# Patient Record
Sex: MALE | Race: WHITE | Employment: FULL TIME | URBAN - METROPOLITAN AREA
[De-identification: names, ages, dates, MRNs, and addresses within clinical notes are randomized per-mention and may not be internally consistent; named-entity substitution may affect disease eponyms.]

---

## 2017-03-01 ENCOUNTER — TELEPHONE (OUTPATIENT)
Dept: INTERNAL MEDICINE CLINIC | Age: 52
End: 2017-03-01

## 2017-03-21 ENCOUNTER — HOSPITAL ENCOUNTER (OUTPATIENT)
Dept: ENDOSCOPY | Age: 52
Discharge: HOME | End: 2017-03-21
Attending: INTERNAL MEDICINE | Admitting: INTERNAL MEDICINE

## 2017-03-21 VITALS
OXYGEN SATURATION: 97 % | TEMPERATURE: 97.5 F | BODY MASS INDEX: 31.81 KG/M2 | RESPIRATION RATE: 16 BRPM | WEIGHT: 222.2 LBS | DIASTOLIC BLOOD PRESSURE: 91 MMHG | HEART RATE: 82 BPM | SYSTOLIC BLOOD PRESSURE: 135 MMHG | HEIGHT: 70 IN

## 2017-03-21 DIAGNOSIS — R52 PAIN: ICD-10-CM

## 2017-03-21 LAB
ANION GAP SERPL CALCULATED.3IONS-SCNC: 14 MMOL/L (ref 3–16)
BUN BLDV-MCNC: 10 MG/DL (ref 7–20)
CALCIUM SERPL-MCNC: 8.6 MG/DL (ref 8.3–10.6)
CHLORIDE BLD-SCNC: 102 MMOL/L (ref 99–110)
CO2: 24 MMOL/L (ref 21–32)
CREAT SERPL-MCNC: 0.9 MG/DL (ref 0.9–1.3)
GFR AFRICAN AMERICAN: >60
GFR NON-AFRICAN AMERICAN: >60
GLUCOSE BLD-MCNC: 106 MG/DL (ref 70–99)
POTASSIUM SERPL-SCNC: 3.9 MMOL/L (ref 3.5–5.1)
SODIUM BLD-SCNC: 140 MMOL/L (ref 136–145)

## 2017-03-21 RX ORDER — LIDOCAINE HYDROCHLORIDE 10 MG/ML
1 INJECTION, SOLUTION EPIDURAL; INFILTRATION; INTRACAUDAL; PERINEURAL
Status: ACTIVE | OUTPATIENT
Start: 2017-03-21 | End: 2017-03-21

## 2017-03-21 RX ORDER — ONDANSETRON 2 MG/ML
4 INJECTION INTRAMUSCULAR; INTRAVENOUS
Status: ACTIVE | OUTPATIENT
Start: 2017-03-21 | End: 2017-03-21

## 2017-03-21 RX ORDER — SODIUM CHLORIDE 9 MG/ML
INJECTION, SOLUTION INTRAVENOUS CONTINUOUS
Status: DISCONTINUED | OUTPATIENT
Start: 2017-03-21 | End: 2017-03-22 | Stop reason: HOSPADM

## 2017-03-21 RX ORDER — SODIUM CHLORIDE 0.9 % (FLUSH) 0.9 %
10 SYRINGE (ML) INJECTION PRN
Status: DISCONTINUED | OUTPATIENT
Start: 2017-03-21 | End: 2017-03-22 | Stop reason: HOSPADM

## 2017-03-21 RX ORDER — SODIUM CHLORIDE 0.9 % (FLUSH) 0.9 %
10 SYRINGE (ML) INJECTION EVERY 12 HOURS SCHEDULED
Status: DISCONTINUED | OUTPATIENT
Start: 2017-03-21 | End: 2017-03-22 | Stop reason: HOSPADM

## 2017-03-21 RX ORDER — FENTANYL CITRATE 50 UG/ML
25 INJECTION, SOLUTION INTRAMUSCULAR; INTRAVENOUS EVERY 5 MIN PRN
Status: DISCONTINUED | OUTPATIENT
Start: 2017-03-21 | End: 2017-03-22 | Stop reason: HOSPADM

## 2017-03-21 RX ORDER — MEPERIDINE HYDROCHLORIDE 25 MG/ML
12.5 INJECTION INTRAMUSCULAR; INTRAVENOUS; SUBCUTANEOUS EVERY 5 MIN PRN
Status: DISCONTINUED | OUTPATIENT
Start: 2017-03-21 | End: 2017-03-22 | Stop reason: HOSPADM

## 2017-03-21 RX ORDER — FENTANYL CITRATE 50 UG/ML
50 INJECTION, SOLUTION INTRAMUSCULAR; INTRAVENOUS EVERY 5 MIN PRN
Status: DISCONTINUED | OUTPATIENT
Start: 2017-03-21 | End: 2017-03-22 | Stop reason: HOSPADM

## 2017-03-21 RX ORDER — COVID-19 ANTIGEN TEST
KIT MISCELLANEOUS
COMMUNITY

## 2017-03-21 RX ORDER — IBUPROFEN 200 MG
200 TABLET ORAL EVERY 6 HOURS PRN
COMMUNITY

## 2017-03-21 ASSESSMENT — PAIN - FUNCTIONAL ASSESSMENT: PAIN_FUNCTIONAL_ASSESSMENT: 0-10

## 2017-04-03 ENCOUNTER — OFFICE VISIT (OUTPATIENT)
Dept: RHEUMATOLOGY | Age: 52
End: 2017-04-03

## 2017-04-03 VITALS
BODY MASS INDEX: 30.92 KG/M2 | WEIGHT: 216 LBS | SYSTOLIC BLOOD PRESSURE: 118 MMHG | HEIGHT: 70 IN | DIASTOLIC BLOOD PRESSURE: 82 MMHG | HEART RATE: 72 BPM

## 2017-04-03 DIAGNOSIS — L40.50 PSORIATIC ARTHRITIS (HCC): Primary | Chronic | ICD-10-CM

## 2017-04-03 LAB
ALBUMIN SERPL-MCNC: 4.2 G/DL (ref 3.4–5)
ALP BLD-CCNC: 84 U/L (ref 40–129)
ALT SERPL-CCNC: 38 U/L (ref 10–40)
AST SERPL-CCNC: 28 U/L (ref 15–37)
BASOPHILS ABSOLUTE: 0 K/UL (ref 0–0.2)
BASOPHILS RELATIVE PERCENT: 0.2 %
BILIRUB SERPL-MCNC: 0.5 MG/DL (ref 0–1)
BILIRUBIN DIRECT: <0.2 MG/DL (ref 0–0.3)
BILIRUBIN, INDIRECT: NORMAL MG/DL (ref 0–1)
CREAT SERPL-MCNC: 0.9 MG/DL (ref 0.9–1.3)
EOSINOPHILS ABSOLUTE: 0.2 K/UL (ref 0–0.6)
EOSINOPHILS RELATIVE PERCENT: 2 %
GFR AFRICAN AMERICAN: >60
GFR NON-AFRICAN AMERICAN: >60
HCT VFR BLD CALC: 47.5 % (ref 40.5–52.5)
HEMOGLOBIN: 16 G/DL (ref 13.5–17.5)
LYMPHOCYTES ABSOLUTE: 1.8 K/UL (ref 1–5.1)
LYMPHOCYTES RELATIVE PERCENT: 23.4 %
MCH RBC QN AUTO: 30.7 PG (ref 26–34)
MCHC RBC AUTO-ENTMCNC: 33.6 G/DL (ref 31–36)
MCV RBC AUTO: 91.4 FL (ref 80–100)
MONOCYTES ABSOLUTE: 0.3 K/UL (ref 0–1.3)
MONOCYTES RELATIVE PERCENT: 4.5 %
NEUTROPHILS ABSOLUTE: 5.3 K/UL (ref 1.7–7.7)
NEUTROPHILS RELATIVE PERCENT: 69.9 %
PDW BLD-RTO: 14.2 % (ref 12.4–15.4)
PLATELET # BLD: 189 K/UL (ref 135–450)
PMV BLD AUTO: 8.3 FL (ref 5–10.5)
RBC # BLD: 5.2 M/UL (ref 4.2–5.9)
TOTAL PROTEIN: 7.3 G/DL (ref 6.4–8.2)
WBC # BLD: 7.6 K/UL (ref 4–11)

## 2017-04-03 PROCEDURE — 99213 OFFICE O/P EST LOW 20 MIN: CPT | Performed by: INTERNAL MEDICINE

## 2017-04-03 RX ORDER — SUCRALFATE ORAL 1 G/10ML
1 SUSPENSION ORAL 4 TIMES DAILY
COMMUNITY

## 2017-04-03 RX ORDER — FLUTICASONE PROPIONATE 220 UG/1
1 AEROSOL, METERED RESPIRATORY (INHALATION) 2 TIMES DAILY
COMMUNITY

## 2017-04-03 RX ORDER — RIFAXIMIN 550 MG/1
TABLET ORAL
COMMUNITY
Start: 2017-03-29

## 2017-04-03 RX ORDER — PREDNISONE 10 MG/1
10 TABLET ORAL DAILY
COMMUNITY
End: 2017-07-24

## 2017-04-03 RX ORDER — HYDROCODONE BITARTRATE AND ACETAMINOPHEN 10; 325 MG/1; MG/1
1 TABLET ORAL EVERY 6 HOURS PRN
COMMUNITY
End: 2017-10-24 | Stop reason: HOSPADM

## 2017-04-06 LAB
QUANTIFERON (R) TB GOLD (INCUBATED): NEGATIVE
QUANTIFERON MITOGEN: >10 IU/ML
QUANTIFERON NIL: 0.03 IU/ML
QUANTIFERON TB AG MINUS NIL: 0.01 IU/ML (ref 0–0.34)

## 2017-06-21 ENCOUNTER — TELEPHONE (OUTPATIENT)
Dept: INTERNAL MEDICINE CLINIC | Age: 52
End: 2017-06-21

## 2017-07-24 ENCOUNTER — OFFICE VISIT (OUTPATIENT)
Dept: RHEUMATOLOGY | Age: 52
End: 2017-07-24

## 2017-07-24 VITALS
BODY MASS INDEX: 29.35 KG/M2 | WEIGHT: 205 LBS | SYSTOLIC BLOOD PRESSURE: 120 MMHG | HEIGHT: 70 IN | HEART RATE: 64 BPM | DIASTOLIC BLOOD PRESSURE: 82 MMHG

## 2017-07-24 DIAGNOSIS — L40.50 PSORIATIC ARTHRITIS (HCC): Primary | Chronic | ICD-10-CM

## 2017-07-24 DIAGNOSIS — L40.9 PSORIASIS: Chronic | ICD-10-CM

## 2017-07-24 DIAGNOSIS — Z79.899 ENCOUNTER FOR LONG-TERM (CURRENT) USE OF MEDICATIONS: ICD-10-CM

## 2017-07-24 LAB
ALBUMIN SERPL-MCNC: 4.4 G/DL (ref 3.4–5)
ALP BLD-CCNC: 77 U/L (ref 40–129)
ALT SERPL-CCNC: 13 U/L (ref 10–40)
AST SERPL-CCNC: 14 U/L (ref 15–37)
BASOPHILS ABSOLUTE: 0 K/UL (ref 0–0.2)
BASOPHILS RELATIVE PERCENT: 0.5 %
BILIRUB SERPL-MCNC: 0.3 MG/DL (ref 0–1)
BILIRUBIN DIRECT: <0.2 MG/DL (ref 0–0.3)
BILIRUBIN, INDIRECT: ABNORMAL MG/DL (ref 0–1)
CREAT SERPL-MCNC: 0.9 MG/DL (ref 0.9–1.3)
EOSINOPHILS ABSOLUTE: 0.4 K/UL (ref 0–0.6)
EOSINOPHILS RELATIVE PERCENT: 5 %
GFR AFRICAN AMERICAN: >60
GFR NON-AFRICAN AMERICAN: >60
HCT VFR BLD CALC: 46.8 % (ref 40.5–52.5)
HEMOGLOBIN: 15.8 G/DL (ref 13.5–17.5)
LYMPHOCYTES ABSOLUTE: 1.5 K/UL (ref 1–5.1)
LYMPHOCYTES RELATIVE PERCENT: 19 %
MCH RBC QN AUTO: 31.4 PG (ref 26–34)
MCHC RBC AUTO-ENTMCNC: 33.7 G/DL (ref 31–36)
MCV RBC AUTO: 93.2 FL (ref 80–100)
MONOCYTES ABSOLUTE: 0.3 K/UL (ref 0–1.3)
MONOCYTES RELATIVE PERCENT: 4.1 %
NEUTROPHILS ABSOLUTE: 5.7 K/UL (ref 1.7–7.7)
NEUTROPHILS RELATIVE PERCENT: 71.4 %
PDW BLD-RTO: 13.3 % (ref 12.4–15.4)
PLATELET # BLD: 182 K/UL (ref 135–450)
PMV BLD AUTO: 7.7 FL (ref 5–10.5)
RBC # BLD: 5.02 M/UL (ref 4.2–5.9)
TOTAL PROTEIN: 7 G/DL (ref 6.4–8.2)
WBC # BLD: 8 K/UL (ref 4–11)

## 2017-07-24 PROCEDURE — 99213 OFFICE O/P EST LOW 20 MIN: CPT | Performed by: INTERNAL MEDICINE

## 2017-07-24 RX ORDER — TEMAZEPAM 15 MG/1
15 CAPSULE ORAL
COMMUNITY
End: 2018-08-02

## 2017-07-24 RX ORDER — ONDANSETRON 4 MG/1
4 TABLET, FILM COATED ORAL
COMMUNITY
Start: 2017-06-30 | End: 2018-08-02

## 2017-08-17 RX ORDER — PREDNISONE 1 MG/1
5 TABLET ORAL 2 TIMES DAILY
Qty: 60 TABLET | Refills: 0 | Status: SHIPPED | OUTPATIENT
Start: 2017-08-17 | End: 2017-11-28 | Stop reason: SDUPTHER

## 2017-08-18 RX ORDER — PREDNISONE 1 MG/1
5 TABLET ORAL 2 TIMES DAILY
Qty: 60 TABLET | Refills: 0 | Status: SHIPPED | OUTPATIENT
Start: 2017-08-18 | End: 2018-06-13

## 2017-08-21 ENCOUNTER — TELEPHONE (OUTPATIENT)
Dept: INTERNAL MEDICINE CLINIC | Age: 52
End: 2017-08-21

## 2017-10-03 ENCOUNTER — TELEPHONE (OUTPATIENT)
Dept: INTERNAL MEDICINE CLINIC | Age: 52
End: 2017-10-03

## 2017-10-24 ENCOUNTER — HOSPITAL ENCOUNTER (OUTPATIENT)
Dept: ENDOSCOPY | Age: 52
Discharge: HOME | End: 2017-10-24
Attending: INTERNAL MEDICINE | Admitting: INTERNAL MEDICINE

## 2017-10-24 VITALS
DIASTOLIC BLOOD PRESSURE: 84 MMHG | SYSTOLIC BLOOD PRESSURE: 134 MMHG | TEMPERATURE: 98.3 F | BODY MASS INDEX: 29.82 KG/M2 | HEIGHT: 69 IN | RESPIRATION RATE: 16 BRPM | OXYGEN SATURATION: 98 % | HEART RATE: 67 BPM | WEIGHT: 201.3 LBS

## 2017-10-24 DIAGNOSIS — R11.2 NAUSEA VOMITING AND DIARRHEA: ICD-10-CM

## 2017-10-24 DIAGNOSIS — R19.7 NAUSEA VOMITING AND DIARRHEA: ICD-10-CM

## 2017-10-24 DIAGNOSIS — R52 PAIN: ICD-10-CM

## 2017-10-24 LAB
ANION GAP SERPL CALCULATED.3IONS-SCNC: 11 MMOL/L (ref 3–16)
BUN BLDV-MCNC: 11 MG/DL (ref 7–20)
CALCIUM SERPL-MCNC: 9 MG/DL (ref 8.3–10.6)
CHLORIDE BLD-SCNC: 105 MMOL/L (ref 99–110)
CO2: 24 MMOL/L (ref 21–32)
CREAT SERPL-MCNC: 0.9 MG/DL (ref 0.9–1.3)
GFR AFRICAN AMERICAN: >60
GFR NON-AFRICAN AMERICAN: >60
GLUCOSE BLD-MCNC: 124 MG/DL (ref 70–99)
POTASSIUM SERPL-SCNC: 3.9 MMOL/L (ref 3.5–5.1)
SODIUM BLD-SCNC: 140 MMOL/L (ref 136–145)

## 2017-10-24 RX ORDER — FENTANYL CITRATE 50 UG/ML
25 INJECTION, SOLUTION INTRAMUSCULAR; INTRAVENOUS EVERY 5 MIN PRN
Status: DISCONTINUED | OUTPATIENT
Start: 2017-10-24 | End: 2017-10-25 | Stop reason: HOSPADM

## 2017-10-24 RX ORDER — SODIUM CHLORIDE 0.9 % (FLUSH) 0.9 %
10 SYRINGE (ML) INJECTION PRN
Status: DISCONTINUED | OUTPATIENT
Start: 2017-10-24 | End: 2017-10-25 | Stop reason: HOSPADM

## 2017-10-24 RX ORDER — HYDROCODONE BITARTRATE AND ACETAMINOPHEN 5; 325 MG/1; MG/1
1 TABLET ORAL EVERY 6 HOURS PRN
Qty: 20 TABLET | Refills: 0 | Status: SHIPPED | OUTPATIENT
Start: 2017-10-24 | End: 2017-10-31

## 2017-10-24 RX ORDER — MEPERIDINE HYDROCHLORIDE 25 MG/ML
12.5 INJECTION INTRAMUSCULAR; INTRAVENOUS; SUBCUTANEOUS EVERY 5 MIN PRN
Status: DISCONTINUED | OUTPATIENT
Start: 2017-10-24 | End: 2017-10-25 | Stop reason: HOSPADM

## 2017-10-24 RX ORDER — LABETALOL HYDROCHLORIDE 5 MG/ML
5 INJECTION, SOLUTION INTRAVENOUS EVERY 10 MIN PRN
Status: DISCONTINUED | OUTPATIENT
Start: 2017-10-24 | End: 2017-10-25 | Stop reason: HOSPADM

## 2017-10-24 RX ORDER — ONDANSETRON 4 MG/1
4 TABLET, FILM COATED ORAL EVERY 8 HOURS PRN
Qty: 25 TABLET | Refills: 1 | Status: SHIPPED | OUTPATIENT
Start: 2017-10-24

## 2017-10-24 RX ORDER — SODIUM CHLORIDE 9 MG/ML
INJECTION, SOLUTION INTRAVENOUS CONTINUOUS
Status: DISCONTINUED | OUTPATIENT
Start: 2017-10-24 | End: 2017-10-25 | Stop reason: HOSPADM

## 2017-10-24 RX ORDER — ONDANSETRON 2 MG/ML
4 INJECTION INTRAMUSCULAR; INTRAVENOUS
Status: ACTIVE | OUTPATIENT
Start: 2017-10-24 | End: 2017-10-24

## 2017-10-24 RX ORDER — FENTANYL CITRATE 50 UG/ML
50 INJECTION, SOLUTION INTRAMUSCULAR; INTRAVENOUS EVERY 5 MIN PRN
Status: DISCONTINUED | OUTPATIENT
Start: 2017-10-24 | End: 2017-10-25 | Stop reason: HOSPADM

## 2017-10-24 RX ORDER — DIPHENHYDRAMINE HYDROCHLORIDE 50 MG/ML
12.5 INJECTION INTRAMUSCULAR; INTRAVENOUS
Status: ACTIVE | OUTPATIENT
Start: 2017-10-24 | End: 2017-10-24

## 2017-10-24 RX ORDER — SODIUM CHLORIDE 0.9 % (FLUSH) 0.9 %
10 SYRINGE (ML) INJECTION EVERY 12 HOURS SCHEDULED
Status: DISCONTINUED | OUTPATIENT
Start: 2017-10-24 | End: 2017-10-25 | Stop reason: HOSPADM

## 2017-10-24 RX ADMIN — FENTANYL CITRATE 50 MCG: 50 INJECTION, SOLUTION INTRAMUSCULAR; INTRAVENOUS at 08:46

## 2017-10-24 RX ADMIN — SODIUM CHLORIDE: 9 INJECTION, SOLUTION INTRAVENOUS at 08:30

## 2017-10-24 RX ADMIN — SODIUM CHLORIDE: 9 INJECTION, SOLUTION INTRAVENOUS at 07:23

## 2017-10-24 ASSESSMENT — PAIN SCALES - GENERAL
PAINLEVEL_OUTOF10: 2
PAINLEVEL_OUTOF10: 7
PAINLEVEL_OUTOF10: 6

## 2017-10-24 ASSESSMENT — PAIN DESCRIPTION - PAIN TYPE
TYPE: SURGICAL PAIN
TYPE: SURGICAL PAIN

## 2017-10-24 ASSESSMENT — PAIN - FUNCTIONAL ASSESSMENT: PAIN_FUNCTIONAL_ASSESSMENT: 0-10

## 2017-10-24 ASSESSMENT — PAIN DESCRIPTION - ORIENTATION: ORIENTATION: UPPER

## 2017-10-24 ASSESSMENT — PAIN DESCRIPTION - LOCATION
LOCATION: ABDOMEN
LOCATION: ABDOMEN

## 2017-10-24 NOTE — ANESTHESIA PRE-OP
[address] [facility] Anesthesiology  Pre-Anesthesia Evaluation/Consultation       Name:  Virginia Tesfaye                                         Age:  47 y.o.   MRN:    [id number]           Procedure (Scheduled): ERCP ENDOSCOPIC RETROGRADE CHOLANGIOPANCREATOGRAPHY   Surgeon:     Dr. Chad Reed MD     Allergies   Allergen Reactions    Sulfites     Vicodin [Hydrocodone-Acetaminophen] Shortness Of Breath    Celecoxib     Crestor [Rosuvastatin]      myalgias    Cymbalta [Duloxetine Hcl]     Dilaudid [Hydromorphone Hcl]      hyper    Ibuprofen     Lidocaine     Methotrexate Derivatives      Nausea and diarrhea    Methylphenidate      Abdominal pain+nausea    Morphine     Morphine And Related Other (See Comments)     Jittery, hyperactivity, unable to sleep    Nortriptyline     Other      All elissa food coloring mold  msg addatives    Percocet [Oxycodone-Acetaminophen]     Procaine     Questran [Cholestyramine] Other (See Comments)     Abdominal pain + nausea    Sulfa Antibiotics      swelling    Valium [Diazepam]     Vioxx [Rofecoxib]     Welchol [Colesevelam Hcl]     Xylocaine [Lidocaine Hcl]     Cortisone Nausea And Vomiting     Patient Active Problem List   Diagnosis    Seronegative arthritis    Psoriatic arthritis ([facility] [address]    Psoriasis     Past Medical History:   Diagnosis Date    Anesthesia     wakes up early    Arthritis     psoriatic    Asthma     GERD (gastroesophageal reflux disease)     Headache(784.0)     Hyperlipidemia     Hypertension     Irritable bowel syndrome     Seizures ([facility] [address]     27 years ago  accident no seizure in years     Past Surgical History:   Procedure Laterality Date    CHOLECYSTECTOMY      DENTAL SURGERY      ERCP  03/21/2017    with sphincterotomy, stent placement, biopsy , balloon sweep    HERNIA REPAIR      SHOULDER SURGERY      x2    UPPER GASTROINTESTINAL ENDOSCOPY  03/21/2017     Social History   Substance Use Topics    Smoking status: Never Smoker    Smokeless tobacco: Never Used    Alcohol use No       Prior to Admission medications    Medication Sig Start Date End Date Taking? Authorizing Provider   predniSONE (DELTASONE) 5 MG tablet Take 1 tablet by mouth 2 times daily 8/18/17  Yes Cory Wiggins MD   adalimumab (HUMIRA) 40 MG/0.8ML injection Inject 40 mg into the skin once a week 8/17/17  Yes Cory Wiggins MD   sertraline (ZOLOFT) 50 MG tablet TAKE 1 TABLET BY MOUTH AT SUPPERTIME 6/7/17  Yes Historical Provider, MD   sucralfate (CARAFATE) 1 GM/10ML suspension Take 1 g by mouth 4 times daily   Yes Historical Provider, MD   fluticasone (FLOVENT HFA) 220 MCG/ACT inhaler Inhale 1 puff into the lungs 2 times daily   Yes Historical Provider, MD   Naproxen Sodium (ALEVE) 220 MG CAPS Take by mouth   Yes Historical Provider, MD   ibuprofen (ADVIL;MOTRIN) 200 MG tablet Take 200 mg by mouth every 6 hours as needed for Pain   Yes Historical Provider, MD   tamsulosin (FLOMAX) 0.4 MG capsule Take 0.4 mg by mouth daily   Yes Historical Provider, MD   albuterol sulfate  (90 BASE) MCG/ACT inhaler Inhale 2 puffs into the lungs every 6 hours as needed for Wheezing   Yes Historical Provider, MD   Esomeprazole Magnesium (NEXIUM PO) Take by mouth   Yes Historical Provider, MD   ranitidine (ZANTAC) 300 MG tablet Take 300 mg by mouth 2 times daily   Yes Historical Provider, MD   atorvastatin (LIPITOR) 20 MG tablet TAKE 1 TABLET BY MOUTH DAILY. 10/12/16  Yes Historical Provider, MD   spironolactone (ALDACTONE) 25 MG tablet TAKE 1 TABLET BY MOUTH EVERY DAY 10/12/16  Yes Historical Provider, MD   temazepam (RESTORIL) 15 MG capsule Take 15 mg by mouth nightly as needed for Sleep   Yes Historical Provider, MD   cyanocobalamin 1000 MCG/ML injection INJECT 1ML INTRAMUSCULARLY EVERY 30 DAYS 6/21/15  Yes Colby Hilario MD   diphenhydrAMINE (BENADRYL) 25 MG tablet Take 25 mg by mouth every 6 hours as needed for Itching.    Yes Historical Provider, MD   PROPRANOLOL BY MOUTH AT SUPPERTIME      sucralfate (CARAFATE) 1 GM/10ML suspension Take 1 g by mouth 4 times daily      fluticasone (FLOVENT HFA) 220 MCG/ACT inhaler Inhale 1 puff into the lungs 2 times daily      Naproxen Sodium (ALEVE) 220 MG CAPS Take by mouth      ibuprofen (ADVIL;MOTRIN) 200 MG tablet Take 200 mg by mouth every 6 hours as needed for Pain      tamsulosin (FLOMAX) 0.4 MG capsule Take 0.4 mg by mouth daily      albuterol sulfate  (90 BASE) MCG/ACT inhaler Inhale 2 puffs into the lungs every 6 hours as needed for Wheezing      Esomeprazole Magnesium (NEXIUM PO) Take by mouth      ranitidine (ZANTAC) 300 MG tablet Take 300 mg by mouth 2 times daily      atorvastatin (LIPITOR) 20 MG tablet TAKE 1 TABLET BY MOUTH DAILY. 0    spironolactone (ALDACTONE) 25 MG tablet TAKE 1 TABLET BY MOUTH EVERY DAY  0    temazepam (RESTORIL) 15 MG capsule Take 15 mg by mouth nightly as needed for Sleep      cyanocobalamin 1000 MCG/ML injection INJECT 1ML INTRAMUSCULARLY EVERY 30 DAYS 1 mL 5    diphenhydrAMINE (BENADRYL) 25 MG tablet Take 25 mg by mouth every 6 hours as needed for Itching.  PROPRANOLOL HCL PO   Take 40 mg by mouth daily       clonazePAM (KLONOPIN) 1 MG tablet Take 1 mg by mouth 2 times daily as needed (Uses as a muscle relaxer).  dicyclomine (BENTYL) 10 MG capsule Take 10 mg by mouth 4 times daily (before meals and nightly).  promethazine (PHENERGAN) 25 MG tablet Take 25 mg by mouth every 6 hours as needed.  cyclobenzaprine (FLEXERIL) 10 MG tablet Take 10 mg by mouth 3 times daily as needed.  predniSONE (DELTASONE) 5 MG tablet Take 1 tablet by mouth 2 times daily 60 tablet 0    temazepam (RESTORIL) 15 MG capsule Take 15 mg by mouth      ondansetron (ZOFRAN) 4 MG tablet Take 4 mg by mouth      XIFAXAN 550 MG tablet       HYDROcodone-acetaminophen (NORCO)  MG per tablet Take 1 tablet by mouth every 6 hours as needed for Pain .       ondansetron (ZOFRAN) 4 07/24/2017    HGB 15.8 07/24/2017    HCT 46.8 07/24/2017    MCV 93.2 07/24/2017    RDW 13.3 07/24/2017     07/24/2017       CMP    Lab Results   Component Value Date     03/21/2017    K 3.9 03/21/2017     03/21/2017    CO2 24 03/21/2017    BUN 10 03/21/2017    CREATININE 0.9 07/24/2017    GFRAA >60 07/24/2017    AGRATIO 1.5 12/19/2016    LABGLOM >60 07/24/2017    LABGLOM 91 12/19/2016    GLUCOSE 106 03/21/2017    PROT 7.0 07/24/2017    CALCIUM 8.6 03/21/2017    BILITOT 0.3 07/24/2017    ALKPHOS 77 07/24/2017    AST 14 07/24/2017    ALT 13 07/24/2017       BMP    Lab Results   Component Value Date     03/21/2017    K 3.9 03/21/2017     03/21/2017    CO2 24 03/21/2017    BUN 10 03/21/2017    CREATININE 0.9 07/24/2017    CALCIUM 8.6 03/21/2017    GFRAA >60 07/24/2017    LABGLOM >60 07/24/2017    LABGLOM 91 12/19/2016    GLUCOSE 106 03/21/2017       Coags   No results found for: PROTIME, INR, APTT    HCG (If Applicable) No results found for: PREGTESTUR, PREGSERUM, HCG, HCGQUANT     ABGs  No results found for: PHART, PO2ART, SFJ8XKK, ASZ2COX, BEART, C8XHWOKK     Type & Screen (If Applicable)  No results found for: LABABO, LABRH      POCGlucose  No results for input(s): GLUCOSE in the last 72 hours. NPO Status  > 8 hours   Date of last liquid consumption: 10/23/17   Time of last liquid consumption: 2000   Date of last solid food consumption: 10/23/17      Time of last solid consumption: 1800    BMI  Body mass index is 29.73 kg/m². Estimated body mass index is 29.73 kg/m² as calculated from the following:    Height as of this encounter: 5' 9\" (1.753 m). Weight as of this encounter: 201 lb 4.8 oz (91.3 kg).       Additional Testing (Echo, Stress, ECG, PFTs, etc)        Anesthesia Evaluation  Patient summary reviewed and Nursing notes reviewed no history of anesthetic complications:   Airway: Mallampati: II  TM distance: >3 FB   Neck ROM: full  Mouth opening: > = 3 FB Dental: normal exam for Pain      tamsulosin (FLOMAX) 0.4 MG capsule Take 0.4 mg by mouth daily      albuterol sulfate  (90 BASE) MCG/ACT inhaler Inhale 2 puffs into the lungs every 6 hours as needed for Wheezing      Esomeprazole Magnesium (NEXIUM PO) Take by mouth      ranitidine (ZANTAC) 300 MG tablet Take 300 mg by mouth 2 times daily      atorvastatin (LIPITOR) 20 MG tablet TAKE 1 TABLET BY MOUTH DAILY. 0    spironolactone (ALDACTONE) 25 MG tablet TAKE 1 TABLET BY MOUTH EVERY DAY  0    temazepam (RESTORIL) 15 MG capsule Take 15 mg by mouth nightly as needed for Sleep      cyanocobalamin 1000 MCG/ML injection INJECT 1ML INTRAMUSCULARLY EVERY 30 DAYS 1 mL 5    diphenhydrAMINE (BENADRYL) 25 MG tablet Take 25 mg by mouth every 6 hours as needed for Itching.  PROPRANOLOL HCL PO   Take 40 mg by mouth daily       clonazePAM (KLONOPIN) 1 MG tablet Take 1 mg by mouth 2 times daily as needed (Uses as a muscle relaxer).  dicyclomine (BENTYL) 10 MG capsule Take 10 mg by mouth 4 times daily (before meals and nightly).  promethazine (PHENERGAN) 25 MG tablet Take 25 mg by mouth every 6 hours as needed.  cyclobenzaprine (FLEXERIL) 10 MG tablet Take 10 mg by mouth 3 times daily as needed.  predniSONE (DELTASONE) 5 MG tablet Take 1 tablet by mouth 2 times daily 60 tablet 0    temazepam (RESTORIL) 15 MG capsule Take 15 mg by mouth      ondansetron (ZOFRAN) 4 MG tablet Take 4 mg by mouth      XIFAXAN 550 MG tablet       HYDROcodone-acetaminophen (NORCO)  MG per tablet Take 1 tablet by mouth every 6 hours as needed for Pain .  ondansetron (ZOFRAN) 4 MG tablet Take 1 tablet by mouth every 8 hours as needed for Nausea or Vomiting 25 tablet 1    B-D TB SYRINGE 1CC/25GX5/8\" 25G X 5/8\" 1 ML MISC USE AS DIRECTED 20 each 3    ferrous sulfate 325 (65 FE) MG tablet Take 325 mg by mouth daily (with breakfast).       diphenoxylate-atropine (LOMOTIL) 2.5-0.025 MG per tablet Take 1 tablet by mouth 4 times daily as needed.  hydrocortisone (ANUSOL-HC) 25 MG suppository Place 25 mg rectally 2 times daily. Current Facility-Administered Medications   Medication Dose Route Frequency Provider Last Rate Last Dose    sodium chloride flush 0.9 % injection 10 mL  10 mL Intravenous 2 times per day Megan Camacho MD        sodium chloride flush 0.9 % injection 10 mL  10 mL Intravenous PRN Megan Camacho MD        0.9 % sodium chloride infusion   Intravenous Continuous Megan Camacho MD        triamcinolone acetonide [facility]) injection 20 mg  20 mg Intra-articular Once Charly Caleb, CNP        lidocaine 1 % injection 1 mL  1 mL Intra-articular Once Charly Caleb, CNP           Allergies:     Allergies   Allergen Reactions    Sulfites     Vicodin [Hydrocodone-Acetaminophen] Shortness Of Breath    Celecoxib     Crestor [Rosuvastatin]      myalgias    Cymbalta [Duloxetine Hcl]     Dilaudid [Hydromorphone Hcl]      hyper    Ibuprofen     Lidocaine     Methotrexate Derivatives      Nausea and diarrhea    Methylphenidate      Abdominal pain+nausea    Morphine     Morphine And Related Other (See Comments)     Jittery, hyperactivity, unable to sleep    Nortriptyline     Other      All elissa food coloring mold  msg addatives    Percocet [Oxycodone-Acetaminophen]     Procaine     Questran [Cholestyramine] Other (See Comments)     Abdominal pain + nausea    Sulfa Antibiotics      swelling    Valium [Diazepam]     Vioxx [Rofecoxib]     Welchol [Colesevelam Hcl]     Xylocaine [Lidocaine Hcl]     Cortisone Nausea And Vomiting       Problem List:    Patient Active Problem List   Diagnosis Code    Seronegative arthritis M13.80    Psoriatic arthritis ([facility] [address] L40.50    Psoriasis L40.9       Past Medical History:        Diagnosis Date    Anesthesia     wakes up early    Arthritis     psoriatic    Asthma     GERD (gastroesophageal reflux disease)    

## 2017-10-24 NOTE — H&P
[address] [place] [address]   Pre-operative History and Physical    Patient: Noah Núñez  : 1965  Acct#:     History Obtained From:  patient, electronic medical record    HISTORY OF PRESENT ILLNESS:    The patient is a 47 y.o. male with significant past medical history of eosinophilic esophagitis, psoriatic arthritis and recurrent, episodic RUQ pain and underwent empiric biliary sphincterotomy for presumed ampullary stenosis in 3/2017 who presents with recurrent RUQ pain to r/o pancreatic sphincter of Oddi dysfunction.     Past Medical History:        Diagnosis Date    Anesthesia     wakes up early    Arthritis     psoriatic    Asthma     GERD (gastroesophageal reflux disease)     Headache(784.0)     Hyperlipidemia     Hypertension     Irritable bowel syndrome     Seizures ([address]     27 years ago  accident no seizure in years      Past Surgical History:        Procedure Laterality Date    CHOLECYSTECTOMY      DENTAL SURGERY      ERCP  2017    with sphincterotomy, stent placement, biopsy , balloon sweep    HERNIA REPAIR      SHOULDER SURGERY      x2    UPPER GASTROINTESTINAL ENDOSCOPY  2017      Medications Prior to Admission:   Current Outpatient Prescriptions on File Prior to Encounter   Medication Sig Dispense Refill    predniSONE (DELTASONE) 5 MG tablet Take 1 tablet by mouth 2 times daily 60 tablet 0    adalimumab (HUMIRA) 40 MG/0.8ML injection Inject 40 mg into the skin once a week 4 each 2    sertraline (ZOLOFT) 50 MG tablet TAKE 1 TABLET BY MOUTH AT SUPPERTIME      sucralfate (CARAFATE) 1 GM/10ML suspension Take 1 g by mouth 4 times daily      fluticasone (FLOVENT HFA) 220 MCG/ACT inhaler Inhale 1 puff into the lungs 2 times daily      Naproxen Sodium (ALEVE) 220 MG CAPS Take by mouth      ibuprofen (ADVIL;MOTRIN) 200 MG tablet Take 200 mg by mouth every 6 hours as needed for Pain      tamsulosin (FLOMAX) 0.4 MG capsule Take 0.4 mg by mouth daily      albuterol sulfate  (90 BASE) MCG/ACT inhaler Inhale 2 puffs into the lungs every 6 hours as needed for Wheezing      Esomeprazole Magnesium (NEXIUM PO) Take by mouth      ranitidine (ZANTAC) 300 MG tablet Take 300 mg by mouth 2 times daily      atorvastatin (LIPITOR) 20 MG tablet TAKE 1 TABLET BY MOUTH DAILY. 0    spironolactone (ALDACTONE) 25 MG tablet TAKE 1 TABLET BY MOUTH EVERY DAY  0    temazepam (RESTORIL) 15 MG capsule Take 15 mg by mouth nightly as needed for Sleep      cyanocobalamin 1000 MCG/ML injection INJECT 1ML INTRAMUSCULARLY EVERY 30 DAYS 1 mL 5    diphenhydrAMINE (BENADRYL) 25 MG tablet Take 25 mg by mouth every 6 hours as needed for Itching.  PROPRANOLOL HCL PO   Take 40 mg by mouth daily       clonazePAM (KLONOPIN) 1 MG tablet Take 1 mg by mouth 2 times daily as needed (Uses as a muscle relaxer).  dicyclomine (BENTYL) 10 MG capsule Take 10 mg by mouth 4 times daily (before meals and nightly).  promethazine (PHENERGAN) 25 MG tablet Take 25 mg by mouth every 6 hours as needed.  cyclobenzaprine (FLEXERIL) 10 MG tablet Take 10 mg by mouth 3 times daily as needed.  predniSONE (DELTASONE) 5 MG tablet Take 1 tablet by mouth 2 times daily 60 tablet 0    temazepam (RESTORIL) 15 MG capsule Take 15 mg by mouth      ondansetron (ZOFRAN) 4 MG tablet Take 4 mg by mouth      XIFAXAN 550 MG tablet       HYDROcodone-acetaminophen (NORCO)  MG per tablet Take 1 tablet by mouth every 6 hours as needed for Pain .  ondansetron (ZOFRAN) 4 MG tablet Take 1 tablet by mouth every 8 hours as needed for Nausea or Vomiting 25 tablet 1    B-D TB SYRINGE 1CC/25GX5/8\" 25G X 5/8\" 1 ML MISC USE AS DIRECTED 20 each 3    ferrous sulfate 325 (65 FE) MG tablet Take 325 mg by mouth daily (with breakfast).  diphenoxylate-atropine (LOMOTIL) 2.5-0.025 MG per tablet Take 1 tablet by mouth 4 times daily as needed.         hydrocortisone (ANUSOL-HC) 25 MG suppository Place 25 mg rectally 2 times daily. Current Facility-Administered Medications on File Prior to Encounter   Medication Dose Route Frequency Provider Last Rate Last Dose    triamcinolone acetonide (KENALOG-40) injection 20 mg  20 mg Intra-articular Once Reny Purdue, CNP        lidocaine 1 % injection 1 mL  1 mL Intra-articular Once Reny Purdue, CNP            Allergies:  Sulfites; Vicodin [hydrocodone-acetaminophen]; Celecoxib; Crestor [rosuvastatin]; Cymbalta [duloxetine hcl]; Dilaudid [hydromorphone hcl]; Ibuprofen; Lidocaine; Methotrexate derivatives; Methylphenidate; Morphine; Morphine and related; Nortriptyline; Other; Percocet [oxycodone-acetaminophen]; Procaine; Questran [cholestyramine]; Sulfa antibiotics; Valium [diazepam]; Vioxx [rofecoxib]; Welchol [colesevelam hcl]; Xylocaine [lidocaine hcl]; and Cortisone    History of allergic reaction to anesthesia:  No    Social History:   Social History     Social History    Marital status:      Spouse name: N/A    Number of children: N/A    Years of education: N/A     Occupational History    Not on file.      Social History Main Topics    Smoking status: Never Smoker    Smokeless tobacco: Never Used    Alcohol use No    Drug use: No    Sexual activity: Not on file     Other Topics Concern    Not on file     Social History Narrative    No narrative on file      Family History:       Problem Relation Age of Onset    Cancer Mother     High Blood Pressure Mother     Heart Disease Mother     Diabetes Father     Cancer Father     Stroke Paternal Uncle         PHYSICAL EXAM:      /80   Pulse 76   Temp 98.5 °F (36.9 °C) (Temporal)   Resp 14   Ht 5' 9\" (1.753 m)   Wt 201 lb 4.8 oz (91.3 kg)   SpO2 95%   BMI 29.73 kg/m²  I        Heart:  Normal apical impulse, regular rate and rhythm, normal S1 and S2, no S3 or S4, and no murmur noted    Lungs:  No increased work of breathing, good air exchange, clear to auscultation bilaterally, no

## 2017-10-24 NOTE — OP NOTE
[address] [place] [address]  Endoscopy Note    Patient: Nicolasa Baker   : 1965  Acct#: [de-identified]    Procedure: Endoscopic retrograde cholangiopancreatography with sphincter of Oddi manometry, ampullary biopsy, pancreatic sphincterotomy and pancreatic stent placement    Date: 10/24/2017    Surgeon:  Andres Brooks MD    Referring Physician:   Kris Huggins    Preoperative Diagnosis:   RUQ abdominal pain    Postoperative Diagnosis:   Pancreatic sphincter of Oddi manometry    Anesthesia:  General per Anesthesia. EBL: <50 mL    Indications: This is a 47y.o. year old male who presents today with recurrent, episodic RUQ pain c/w pancreatic-type pain. R/o sphincter of Oddi dysfunction. Procedure: Informed consent was obtained from the patient after explanation of indications, benefits, possible risks and complications of the procedure. The patient was then taken to the endoscopy suite. A time-out was performed. The patient and staff were in agreement as to the correct patient and procedure. The above anesthesia was administered by the anesthesia department. The patient was placed in the left lateral prone position. Vital signs and heart rhythm were monitored prior to and throughout the entire procedure. The Olympus [id number] Video therapeutic duodenoscope was placed in the patient's mouth and blindly advanced into the esophagus. The scope was then advanced through the esophagus, stomach and into the second portion of the duodenum where the major papilla was visualized. The major papilla was c/w previous biliary sphincterotomy. The residual ampullary mound was large and cold biopsies were obtained. The biliary and pancreatic duct orifices were cannulated in turn using a triple-lumen water perfusion aspirating manometry catheter. Findings were as follows:    Sphincter of Oddi Manometry:  The station pull-through method was used to obtain pressure measurements in the pancreatic sphincter. The mean basal sphincter pressure in the pancreatic orifice was 80 mmHg (normal less than 40 mmHg). Cholangiogram:  The biliary orifice was then selectively cannulated and the biliary tree was opacified using a tapered tip, pull - type sphincterotome. The common bile duct, common hepatic duct and intrahepatic ducts were normal.   A cystic duct stump with terminal clips c/w previous cholectectomy was noted. Pancreatogram:  The main pancreatic duct was then selectively cannulated and opacified to the tail. The main pancreatic duct was noted to have a normal course and caliber in the head, body and tail, with normal side branches. The biliary sphinctertomy was extended 4 mm in the 12 o'clock direction using the tapered sphincterotome and blended cautery current over a guidewire. A 6 mm pancreatic sphincterotomy was performed in the 2 o'clock direction using the taper-tipped sphincterotome and blended cautery current over a guidewire. A 3 Fr., 6 cm pancreatic stent with inner flaps removed was then placed for post-ERCP pancreatitis prophylaxis. The cannulas were then withdrawn. The duodenum and stomach were decompressed and the scope was withdrawn from the patient. The patient tolerated the procedure well and was taken to the post anesthesia care unit in good condition. Radiological Interpretation:  All fluoroscopic images and 5 still x-ray films were carefullly examined and interpreted by the endoscopist on the spot during the procedure in the absence of radiologist.  These interpretations guided the course of the procedure and the interventions mentioned above and below.         Impression:  1) Pancreatic sphincter of Oddi dysfunction     2) Normal cholangiogram s/p cholecystectomy     3) Normal pancreatogram     4) Biliary sphincterotomy extended as above     5) Pancreatic sphincterotomy performed     6) Pancreatic stent placement     7) Prominent residual ampullary mound - biopsied      Recommendations: 1) Clear liquid diet today and advance to low fat diet in AM as tolerated. 2) Abdomen xray in 1 month to document stent passage. 3) Check pathology report -- patient instructed to call office in 5 days for results. 4) Follow up in office in 3 months.         Urbano Conley MD  [address]  10/24/2017

## 2017-10-24 NOTE — ANESTHESIA PRE-OP
[address] [facility] Anesthesiology  Pre-Anesthesia Evaluation/Consultation       Name:  Jaylan Wayne                                         Age:  47 y.o.   MRN:    [id number]           Procedure (Scheduled): ERCP ENDOSCOPIC RETROGRADE CHOLANGIOPANCREATOGRAPHY   Surgeon:     Dr. Teofilo Sandoval MD     Allergies   Allergen Reactions    Sulfites     Vicodin [Hydrocodone-Acetaminophen] Shortness Of Breath    Celecoxib     Crestor [Rosuvastatin]      myalgias    Cymbalta [Duloxetine Hcl]     Dilaudid [Hydromorphone Hcl]      hyper    Ibuprofen     Lidocaine     Methotrexate Derivatives      Nausea and diarrhea    Methylphenidate      Abdominal pain+nausea    Morphine     Morphine And Related Other (See Comments)     Jittery, hyperactivity, unable to sleep    Nortriptyline     Other      All elissa food coloring mold  msg addatives    Percocet [Oxycodone-Acetaminophen]     Procaine     Questran [Cholestyramine]     Sulfa Antibiotics      swelling    Valium [Diazepam]     Vioxx [Rofecoxib]     Welchol [Colesevelam Hcl]     Xylocaine [Lidocaine Hcl]     Cortisone Nausea And Vomiting     Patient Active Problem List   Diagnosis    Seronegative arthritis    Psoriatic arthritis ([facility] [address]    Psoriasis     Past Medical History:   Diagnosis Date    Anesthesia     wakes up early    Arthritis     psoriatic    Asthma     GERD (gastroesophageal reflux disease)     Headache(784.0)     Hyperlipidemia     Hypertension     Irritable bowel syndrome     Seizures ([address]     27 years ago  accident no seizure in years     Past Surgical History:   Procedure Laterality Date    CHOLECYSTECTOMY      DENTAL SURGERY      ERCP  03/21/2017    with sphincterotomy, stent placement, biopsy , balloon sweep    HERNIA REPAIR      SHOULDER SURGERY      x2    UPPER GASTROINTESTINAL ENDOSCOPY  03/21/2017     Social History   Substance Use Topics    Smoking status: Never Smoker    Smokeless tobacco: Never Used   [employer] Alcohol use No       Prior to Admission medications    Medication Sig Start Date End Date Taking? Authorizing Provider   predniSONE (DELTASONE) 5 MG tablet Take 1 tablet by mouth 2 times daily 8/18/17   Saleem Ndiaye MD   predniSONE (DELTASONE) 5 MG tablet Take 1 tablet by mouth 2 times daily 8/17/17   Saleem Ndiaye MD   adalimumab (HUMIRA) 40 MG/0.8ML injection Inject 40 mg into the skin once a week 8/17/17   Saleem Ndiaye MD   temazepam (RESTORIL) 15 MG capsule Take 15 mg by mouth    Historical Provider, MD   sertraline (ZOLOFT) 50 MG tablet TAKE 1 TABLET BY MOUTH AT SUPPERTIME 6/7/17   Historical Provider, MD   ondansetron (ZOFRAN) 4 MG tablet Take 4 mg by mouth 6/30/17   Historical Provider, MD Al Gambyamile 550 MG tablet  3/29/17   Historical Provider, MD   sucralfate (CARAFATE) 1 GM/10ML suspension Take 1 g by mouth 4 times daily    Historical Provider, MD   fluticasone (FLOVENT HFA) 220 MCG/ACT inhaler Inhale 1 puff into the lungs 2 times daily    Historical Provider, MD   HYDROcodone-acetaminophen (NORCO)  MG per tablet Take 1 tablet by mouth every 6 hours as needed for Pain . Historical Provider, MD   Naproxen Sodium (ALEVE) 220 MG CAPS Take by mouth    Historical Provider, MD   ibuprofen (ADVIL;MOTRIN) 200 MG tablet Take 200 mg by mouth every 6 hours as needed for Pain    Historical Provider, MD   tamsulosin (FLOMAX) 0.4 MG capsule Take 0.4 mg by mouth daily    Historical Provider, MD   albuterol sulfate  (90 BASE) MCG/ACT inhaler Inhale 2 puffs into the lungs every 6 hours as needed for Wheezing    Historical Provider, MD   Esomeprazole Magnesium (NEXIUM PO) Take by mouth    Historical Provider, MD   omeprazole (PRILOSEC) 40 MG delayed release capsule Take 40 mg by mouth 2 times daily    Historical Provider, MD   ranitidine (ZANTAC) 300 MG tablet Take 300 mg by mouth 2 times daily    Historical Provider, MD   atorvastatin (LIPITOR) 20 MG tablet TAKE 1 TABLET BY MOUTH DAILY. 10/12/16   Karissa Fontenot MD   spironolactone (ALDACTONE) 25 MG tablet TAKE 1 TABLET BY MOUTH EVERY DAY 10/12/16   Karissa Fontenot MD   temazepam (RESTORIL) 15 MG capsule Take 15 mg by mouth nightly as needed for Sleep    Karissa Fontenot MD   ondansetron (ZOFRAN) 4 MG tablet Take 1 tablet by mouth every 8 hours as needed for Nausea or Vomiting 11/11/16   Liss Mccormick, CNP   B-D TB SYRINGE 1CC/25GX5/8\" 25G X 5/8\" 1 ML MISC USE AS DIRECTED 4/26/16   Cammie Devine MD   cyanocobalamin 1000 MCG/ML injection INJECT 1ML INTRAMUSCULARLY EVERY 30 DAYS 6/21/15   Cammie Devine MD   diphenhydrAMINE (BENADRYL) 25 MG tablet Take 25 mg by mouth every 6 hours as needed for Itching. Historical Provider, MD   PROPRANOLOL HCL PO   Take 40 mg by mouth daily     Historical Provider, MD   clonazePAM (KLONOPIN) 1 MG tablet Take 1 mg by mouth 2 times daily as needed (Uses as a muscle relaxer). Historical Provider, MD   ferrous sulfate 325 (65 FE) MG tablet Take 325 mg by mouth daily (with breakfast). Historical Provider, MD   dicyclomine (BENTYL) 10 MG capsule Take 10 mg by mouth 4 times daily (before meals and nightly). Historical Provider, MD   promethazine (PHENERGAN) 25 MG tablet Take 25 mg by mouth every 6 hours as needed. Historical Provider, MD   cyclobenzaprine (FLEXERIL) 10 MG tablet Take 10 mg by mouth 3 times daily as needed. Historical Provider, MD   diphenoxylate-atropine (LOMOTIL) 2.5-0.025 MG per tablet Take 1 tablet by mouth 4 times daily as needed. Historical Provider, MD   hydrocortisone (ANUSOL-HC) 25 MG suppository Place 25 mg rectally 2 times daily.       Historical Provider, MD       Current Outpatient Prescriptions   Medication Sig Dispense Refill    predniSONE (DELTASONE) 5 MG tablet Take 1 tablet by mouth 2 times daily 60 tablet 0    predniSONE (DELTASONE) 5 MG tablet Take 1 tablet by mouth 2 times daily 60 tablet 0    adalimumab (HUMIRA) 40 MG/0.8ML injection Inject 40 mg into the skin once a week 4 each 2    temazepam (RESTORIL) 15 MG capsule Take 15 mg by mouth      sertraline (ZOLOFT) 50 MG tablet TAKE 1 TABLET BY MOUTH AT SUPPERTIME      ondansetron (ZOFRAN) 4 MG tablet Take 4 mg by mouth      XIFAXAN 550 MG tablet       sucralfate (CARAFATE) 1 GM/10ML suspension Take 1 g by mouth 4 times daily      fluticasone (FLOVENT HFA) 220 MCG/ACT inhaler Inhale 1 puff into the lungs 2 times daily      HYDROcodone-acetaminophen (NORCO)  MG per tablet Take 1 tablet by mouth every 6 hours as needed for Pain .  Naproxen Sodium (ALEVE) 220 MG CAPS Take by mouth      ibuprofen (ADVIL;MOTRIN) 200 MG tablet Take 200 mg by mouth every 6 hours as needed for Pain      tamsulosin (FLOMAX) 0.4 MG capsule Take 0.4 mg by mouth daily      albuterol sulfate  (90 BASE) MCG/ACT inhaler Inhale 2 puffs into the lungs every 6 hours as needed for Wheezing      Esomeprazole Magnesium (NEXIUM PO) Take by mouth      omeprazole (PRILOSEC) 40 MG delayed release capsule Take 40 mg by mouth 2 times daily      ranitidine (ZANTAC) 300 MG tablet Take 300 mg by mouth 2 times daily      atorvastatin (LIPITOR) 20 MG tablet TAKE 1 TABLET BY MOUTH DAILY. 0    spironolactone (ALDACTONE) 25 MG tablet TAKE 1 TABLET BY MOUTH EVERY DAY  0    temazepam (RESTORIL) 15 MG capsule Take 15 mg by mouth nightly as needed for Sleep      ondansetron (ZOFRAN) 4 MG tablet Take 1 tablet by mouth every 8 hours as needed for Nausea or Vomiting 25 tablet 1    B-D TB SYRINGE 1CC/25GX5/8\" 25G X 5/8\" 1 ML MISC USE AS DIRECTED 20 each 3    cyanocobalamin 1000 MCG/ML injection INJECT 1ML INTRAMUSCULARLY EVERY 30 DAYS 1 mL 5    diphenhydrAMINE (BENADRYL) 25 MG tablet Take 25 mg by mouth every 6 hours as needed for Itching.  PROPRANOLOL HCL PO   Take 40 mg by mouth daily       clonazePAM (KLONOPIN) 1 MG tablet Take 1 mg by mouth 2 times daily as needed (Uses as a muscle relaxer).       ferrous sulfate 325 (65 FE) MG tablet Take 325 mg by mouth daily (with breakfast).  dicyclomine (BENTYL) 10 MG capsule Take 10 mg by mouth 4 times daily (before meals and nightly).  promethazine (PHENERGAN) 25 MG tablet Take 25 mg by mouth every 6 hours as needed.  cyclobenzaprine (FLEXERIL) 10 MG tablet Take 10 mg by mouth 3 times daily as needed.  diphenoxylate-atropine (LOMOTIL) 2.5-0.025 MG per tablet Take 1 tablet by mouth 4 times daily as needed.  hydrocortisone (ANUSOL-HC) 25 MG suppository Place 25 mg rectally 2 times daily. Current Facility-Administered Medications   Medication Dose Route Frequency Provider Last Rate Last Dose    triamcinolone acetonide (KENALOG-40) injection 20 mg  20 mg Intra-articular Once Art ANALISA Marino        lidocaine 1 % injection 1 mL  1 mL Intra-articular Once Art ANALISA Marino           Vital Signs  (Current) There were no vitals filed for this visit.   (for past 48 hrs)  No Data Recorded  (last three values)   BP Readings from Last 3 Encounters:   07/24/17 120/82   04/03/17 118/82   03/21/17 (!) 135/91       CBC  Lab Results   Component Value Date    WBC 8.0 07/24/2017    RBC 5.02 07/24/2017    HGB 15.8 07/24/2017    HCT 46.8 07/24/2017    MCV 93.2 07/24/2017    RDW 13.3 07/24/2017     07/24/2017       CMP    Lab Results   Component Value Date     03/21/2017    K 3.9 03/21/2017     03/21/2017    CO2 24 03/21/2017    BUN 10 03/21/2017    CREATININE 0.9 07/24/2017    GFRAA >60 07/24/2017    AGRATIO 1.5 12/19/2016    LABGLOM >60 07/24/2017    LABGLOM 91 12/19/2016    GLUCOSE 106 03/21/2017    PROT 7.0 07/24/2017    CALCIUM 8.6 03/21/2017    BILITOT 0.3 07/24/2017    ALKPHOS 77 07/24/2017    AST 14 07/24/2017    ALT 13 07/24/2017       BMP    Lab Results   Component Value Date     03/21/2017    K 3.9 03/21/2017     03/21/2017    CO2 24 03/21/2017    BUN 10 03/21/2017    CREATININE 0.9 07/24/2017 CALCIUM 8.6 03/21/2017    GFRAA >60 07/24/2017    LABGLOM >60 07/24/2017    LABGLOM 91 12/19/2016    GLUCOSE 106 03/21/2017       Coags   No results found for: PROTIME, INR, APTT    HCG (If Applicable) No results found for: PREGTESTUR, PREGSERUM, HCG, HCGQUANT     ABGs  No results found for: PHART, PO2ART, RYC5BGI, IQX3CEX, BEART, L6EROFHJ     Type & Screen (If Applicable)  No results found for: LABABO, LABRH      POCGlucose  No results for input(s): GLUCOSE in the last 72 hours. NPO Status  > 8 hours                       BMI  There is no height or weight on file to calculate BMI. Estimated body mass index is 27.22 kg/m² as calculated from the following:    Height as of 10/5/17: 5' 9.5\" (1.765 m). Weight as of 10/5/17: 187 lb (84.8 kg). Additional Testing (Echo, Stress, ECG, PFTs, etc)        Anesthesia Evaluation  Patient summary reviewed and Nursing notes reviewed no history of anesthetic complications:   Airway: Mallampati: II  TM distance: >3 FB   Neck ROM: full  Mouth opening: > = 3 FB Dental: normal exam         Pulmonary:normal exam    (+) asthma:                            Cardiovascular:  Exercise tolerance: good (>4 METS),   (+) hypertension: moderate, hyperlipidemia      ECG reviewed  Rhythm: regular  Rate: normal           Beta Blocker:  Dose within 24 Hrs         Neuro/Psych:   (+) headaches:,             GI/Hepatic/Renal:   (+) GERD: no interval change, PUD,           Endo/Other:    (+) : arthritis: no interval change. Abdominal:           Vascular:                                      Anesthesia Plan      general     ASA 3     (Plan for GETA with standard ASA monitoring. Additional monitoring as dictated by intra-operative course. Patient appropriately NPO for the procedure. Risk/Benefits reviewed with patient and all anesthetic questions answered prior to procedure.  )  Induction: intravenous.     MIPS: Postoperative opioids intended and Prophylactic antiemetics administered. Anesthetic plan and risks discussed with patient. Plan discussed with CRNA. DOS STAFF ADDENDUM:    Pt seen and examined, chart reviewed (including anesthesia, drug and allergy history). No interval changes to history and physical examination. Anesthetic plan, risks, benefits, alternatives, and personnel involved discussed with patient. Patient verbalized an understanding and agrees to proceed.       Debbie Starr DO  October 24, 2017  6:32 AM

## 2017-10-24 NOTE — PROGRESS NOTES
Adm to pacu from endo per cart awakening. Respirations symmetrical. Abdomen soft. C/O pain upper abdomen & sore throat. Ice chips offered.

## 2017-10-25 NOTE — ANESTHESIA POST-OP
Anesthesia Post-op Note    Patient: Lewis Yang  MRN: [id number]  YOB: 1965  Date of evaluation: 10/25/2017  Time:  1:35 PM     Procedure(s) Performed:     Last Vitals: /84   Pulse 67   Temp 98.3 °F (36.8 °C) (Temporal)   Resp 16   Ht 5' 9\" (1.753 m)   Wt 201 lb 4.8 oz (91.3 kg)   SpO2 98%   BMI 29.73 kg/m²     Heather Phase I: Heather Score: 10    Heather Phase II: Heather Score: 10    Anesthesia Post Evaluation    Final anesthesia type: general  Patient location during evaluation: PACU  Patient participation: complete - patient participated  Level of consciousness: awake and alert  Pain score: 0  Airway patency: patent  Nausea & Vomiting: no nausea and no vomiting  Complications: no  Cardiovascular status: blood pressure returned to baseline  Respiratory status: acceptable  Hydration status: euvolemic        ora DO Evy  1:35 PM

## 2017-11-21 ENCOUNTER — OFFICE VISIT (OUTPATIENT)
Dept: RHEUMATOLOGY | Age: 52
End: 2017-11-21

## 2017-11-21 VITALS
WEIGHT: 208 LBS | DIASTOLIC BLOOD PRESSURE: 84 MMHG | HEART RATE: 76 BPM | BODY MASS INDEX: 30.81 KG/M2 | SYSTOLIC BLOOD PRESSURE: 132 MMHG | HEIGHT: 69 IN

## 2017-11-21 DIAGNOSIS — Z79.899 ENCOUNTER FOR LONG-TERM (CURRENT) USE OF MEDICATIONS: ICD-10-CM

## 2017-11-21 DIAGNOSIS — L40.50 PSORIATIC ARTHRITIS (HCC): Primary | Chronic | ICD-10-CM

## 2017-11-21 LAB
ALBUMIN SERPL-MCNC: 4.3 G/DL (ref 3.4–5)
ALP BLD-CCNC: 57 U/L (ref 40–129)
ALT SERPL-CCNC: 24 U/L (ref 10–40)
AST SERPL-CCNC: 22 U/L (ref 15–37)
BASOPHILS ABSOLUTE: 0 K/UL (ref 0–0.2)
BASOPHILS RELATIVE PERCENT: 0.2 %
BILIRUB SERPL-MCNC: 0.4 MG/DL (ref 0–1)
BILIRUBIN DIRECT: <0.2 MG/DL (ref 0–0.3)
BILIRUBIN, INDIRECT: NORMAL MG/DL (ref 0–1)
CREAT SERPL-MCNC: 0.9 MG/DL (ref 0.9–1.3)
EOSINOPHILS ABSOLUTE: 0.4 K/UL (ref 0–0.6)
EOSINOPHILS RELATIVE PERCENT: 3.8 %
GFR AFRICAN AMERICAN: >60
GFR NON-AFRICAN AMERICAN: >60
HCT VFR BLD CALC: 44.9 % (ref 40.5–52.5)
HEMOGLOBIN: 15.5 G/DL (ref 13.5–17.5)
LYMPHOCYTES ABSOLUTE: 3.5 K/UL (ref 1–5.1)
LYMPHOCYTES RELATIVE PERCENT: 32.4 %
MCH RBC QN AUTO: 30.7 PG (ref 26–34)
MCHC RBC AUTO-ENTMCNC: 34.6 G/DL (ref 31–36)
MCV RBC AUTO: 88.7 FL (ref 80–100)
MONOCYTES ABSOLUTE: 0.6 K/UL (ref 0–1.3)
MONOCYTES RELATIVE PERCENT: 5.5 %
NEUTROPHILS ABSOLUTE: 6.2 K/UL (ref 1.7–7.7)
NEUTROPHILS RELATIVE PERCENT: 58.1 %
PDW BLD-RTO: 13.1 % (ref 12.4–15.4)
PLATELET # BLD: 176 K/UL (ref 135–450)
PMV BLD AUTO: 7.2 FL (ref 5–10.5)
RBC # BLD: 5.06 M/UL (ref 4.2–5.9)
TOTAL PROTEIN: 7 G/DL (ref 6.4–8.2)
WBC # BLD: 10.7 K/UL (ref 4–11)

## 2017-11-21 PROCEDURE — 99213 OFFICE O/P EST LOW 20 MIN: CPT | Performed by: INTERNAL MEDICINE

## 2017-11-21 NOTE — PROGRESS NOTES
exam trace swelling scattered PIPs no definite swelling wrists or knees. Skin psoriatic lesions for some right hand. Patient reports psoriatic lesions in the inner groin  Assessment:      Psoriatic arthritis improved. Biologic therapy      Plan:      Blood work will be obtained today to monitor for adverse drug reactions. Laboratory studies from last visit were reviewed. I'll see the patient back in 4 months time.

## 2017-12-05 ENCOUNTER — HOSPITAL ENCOUNTER (OUTPATIENT)
Dept: ENDOSCOPY | Age: 52
Discharge: HOME | End: 2017-12-05
Attending: INTERNAL MEDICINE | Admitting: INTERNAL MEDICINE

## 2017-12-05 VITALS
OXYGEN SATURATION: 99 % | HEIGHT: 69 IN | RESPIRATION RATE: 14 BRPM | WEIGHT: 207.25 LBS | SYSTOLIC BLOOD PRESSURE: 123 MMHG | TEMPERATURE: 97.2 F | DIASTOLIC BLOOD PRESSURE: 81 MMHG | HEART RATE: 71 BPM | BODY MASS INDEX: 30.7 KG/M2

## 2017-12-05 RX ORDER — ONDANSETRON 2 MG/ML
INJECTION INTRAMUSCULAR; INTRAVENOUS
Status: COMPLETED
Start: 2017-12-05 | End: 2017-12-05

## 2017-12-05 RX ORDER — ONDANSETRON 2 MG/ML
4 INJECTION INTRAMUSCULAR; INTRAVENOUS ONCE
Status: COMPLETED | OUTPATIENT
Start: 2017-12-05 | End: 2017-12-05

## 2017-12-05 RX ORDER — SODIUM CHLORIDE 9 MG/ML
INJECTION, SOLUTION INTRAVENOUS CONTINUOUS
Status: DISCONTINUED | OUTPATIENT
Start: 2017-12-05 | End: 2017-12-06 | Stop reason: HOSPADM

## 2017-12-05 RX ORDER — SODIUM CHLORIDE 0.9 % (FLUSH) 0.9 %
10 SYRINGE (ML) INJECTION EVERY 12 HOURS SCHEDULED
Status: DISCONTINUED | OUTPATIENT
Start: 2017-12-05 | End: 2017-12-06 | Stop reason: HOSPADM

## 2017-12-05 RX ORDER — SODIUM CHLORIDE 0.9 % (FLUSH) 0.9 %
10 SYRINGE (ML) INJECTION PRN
Status: DISCONTINUED | OUTPATIENT
Start: 2017-12-05 | End: 2017-12-06 | Stop reason: HOSPADM

## 2017-12-05 RX ADMIN — ONDANSETRON 4 MG: 2 INJECTION INTRAMUSCULAR; INTRAVENOUS at 10:23

## 2017-12-05 RX ADMIN — SODIUM CHLORIDE: 9 INJECTION, SOLUTION INTRAVENOUS at 09:20

## 2017-12-05 ASSESSMENT — PAIN DESCRIPTION - DESCRIPTORS: DESCRIPTORS: CRAMPING;ACHING;DULL

## 2017-12-05 ASSESSMENT — PAIN - FUNCTIONAL ASSESSMENT: PAIN_FUNCTIONAL_ASSESSMENT: 0-10

## 2017-12-05 NOTE — PROGRESS NOTES
Patient received to PACU in stable condition. VSS. Denies pain. Abdomin soft non tender. Will continue to monitor.

## 2017-12-05 NOTE — ANESTHESIA PRE-OP
[address] [facility] Anesthesiology  Pre-Anesthesia Evaluation/Consultation       Name:  Goran Rivera                                         Age:  47 y.o.   MRN:    [id number]           Procedure (Scheduled): EGD ESOPHAGOGASTRODUODENOSCOPY DILATATION   Surgeon:     Dr. Sheldon Goldsmith MD     Allergies   Allergen Reactions    Sulfites     Vicodin [Hydrocodone-Acetaminophen] Shortness Of Breath    Celecoxib     Crestor [Rosuvastatin]      myalgias    Cymbalta [Duloxetine Hcl]     Dilaudid [Hydromorphone Hcl]      hyper    Ibuprofen     Lidocaine     Methotrexate Derivatives      Nausea and diarrhea    Methylphenidate      Abdominal pain+nausea    Morphine     Morphine And Related Other (See Comments)     Jittery, hyperactivity, unable to sleep    Nortriptyline     Other      All elissa food coloring mold  msg addatives    Percocet [Oxycodone-Acetaminophen]     Procaine     Questran [Cholestyramine] Other (See Comments)     Abdominal pain + nausea    Sulfa Antibiotics      swelling    Valium [Diazepam]     Vioxx [Rofecoxib]     Welchol [Colesevelam Hcl]     Xylocaine [Lidocaine Hcl]     Cortisone Nausea And Vomiting     Patient Active Problem List   Diagnosis    Seronegative arthritis    Psoriatic arthritis ([facility] [address]    Psoriasis     Past Medical History:   Diagnosis Date    Anesthesia     wakes up early    Arthritis     psoriatic    Asthma     GERD (gastroesophageal reflux disease)     Headache(784.0)     Hyperlipidemia     Hypertension     Irritable bowel syndrome     Seizures ([facility] [address]     27 years ago  accident no seizure in years     Past Surgical History:   Procedure Laterality Date    CHOLECYSTECTOMY      DENTAL SURGERY      ERCP  03/21/2017    with sphincterotomy, stent placement, biopsy , balloon sweep    ERCP  10/24/2017    ALLAN,SPHINCTEROTOMY, PLACEMENT OF PANCREATIC STENT, BIOPSY OF AMPULLA    HERNIA REPAIR      SHOULDER SURGERY      x2    UPPER GASTROINTESTINAL 10/12/16   Karissa Fontenot MD   spironolactone (ALDACTONE) 25 MG tablet TAKE 1 TABLET BY MOUTH EVERY DAY 10/12/16   Karissa Fontenot MD   temazepam (RESTORIL) 15 MG capsule Take 15 mg by mouth nightly as needed for Sleep    Historical ProviderMD CAMPO-TOMAS TB SYRINGE 1CC/25GX5/8\" 25G X 5/8\" 1 ML MISC USE AS DIRECTED 4/26/16   Jaxson Sheldon MD   cyanocobalamin 1000 MCG/ML injection INJECT 1ML INTRAMUSCULARLY EVERY 30 DAYS 6/21/15   Jaxson Sheldon MD   diphenhydrAMINE (BENADRYL) 25 MG tablet Take 25 mg by mouth every 6 hours as needed for Itching. Historical Provider, MD   PROPRANOLOL HCL PO   Take 40 mg by mouth daily     Historical Provider, MD   clonazePAM (KLONOPIN) 1 MG tablet Take 1 mg by mouth 2 times daily as needed (Uses as a muscle relaxer). Karissa Provider, MD   ferrous sulfate 325 (65 FE) MG tablet Take 325 mg by mouth daily (with breakfast). Historical Provider, MD   dicyclomine (BENTYL) 10 MG capsule Take 10 mg by mouth 4 times daily (before meals and nightly). Historical Provider, MD   promethazine (PHENERGAN) 25 MG tablet Take 25 mg by mouth every 6 hours as needed. Historical Provider, MD   cyclobenzaprine (FLEXERIL) 10 MG tablet Take 10 mg by mouth 3 times daily as needed. Historical Provider, MD   diphenoxylate-atropine (LOMOTIL) 2.5-0.025 MG per tablet Take 1 tablet by mouth 4 times daily as needed. Historical Provider, MD   hydrocortisone (ANUSOL-HC) 25 MG suppository Place 25 mg rectally 2 times daily.       Historical Provider, MD       Current Outpatient Prescriptions   Medication Sig Dispense Refill    ondansetron (ZOFRAN) 4 MG tablet Take 1 tablet by mouth every 8 hours as needed for Nausea or Vomiting 25 tablet 1    predniSONE (DELTASONE) 5 MG tablet Take 1 tablet by mouth 2 times daily (Patient taking differently: Take 5 mg by mouth 2 times daily Pt states not currently taking) 60 tablet 0    adalimumab (HUMIRA) 40 MG/0.8ML injection Inject 40 mg into the skin once a week 4 each 2    temazepam (RESTORIL) 15 MG capsule Take 15 mg by mouth      sertraline (ZOLOFT) 50 MG tablet TAKE 1 TABLET BY MOUTH AT SUPPERTIME      ondansetron (ZOFRAN) 4 MG tablet Take 4 mg by mouth      XIFAXAN 550 MG tablet       sucralfate (CARAFATE) 1 GM/10ML suspension Take 1 g by mouth 4 times daily      fluticasone (FLOVENT HFA) 220 MCG/ACT inhaler Inhale 1 puff into the lungs 2 times daily      Naproxen Sodium (ALEVE) 220 MG CAPS Take by mouth      ibuprofen (ADVIL;MOTRIN) 200 MG tablet Take 200 mg by mouth every 6 hours as needed for Pain      tamsulosin (FLOMAX) 0.4 MG capsule Take 0.4 mg by mouth daily      albuterol sulfate  (90 BASE) MCG/ACT inhaler Inhale 2 puffs into the lungs every 6 hours as needed for Wheezing      Esomeprazole Magnesium (NEXIUM PO) Take by mouth      ranitidine (ZANTAC) 300 MG tablet Take 300 mg by mouth 2 times daily      atorvastatin (LIPITOR) 20 MG tablet TAKE 1 TABLET BY MOUTH DAILY. 0    spironolactone (ALDACTONE) 25 MG tablet TAKE 1 TABLET BY MOUTH EVERY DAY  0    temazepam (RESTORIL) 15 MG capsule Take 15 mg by mouth nightly as needed for Sleep      B-D TB SYRINGE 1CC/25GX5/8\" 25G X 5/8\" 1 ML MISC USE AS DIRECTED 20 each 3    cyanocobalamin 1000 MCG/ML injection INJECT 1ML INTRAMUSCULARLY EVERY 30 DAYS 1 mL 5    diphenhydrAMINE (BENADRYL) 25 MG tablet Take 25 mg by mouth every 6 hours as needed for Itching.  PROPRANOLOL HCL PO   Take 40 mg by mouth daily       clonazePAM (KLONOPIN) 1 MG tablet Take 1 mg by mouth 2 times daily as needed (Uses as a muscle relaxer).  ferrous sulfate 325 (65 FE) MG tablet Take 325 mg by mouth daily (with breakfast).  dicyclomine (BENTYL) 10 MG capsule Take 10 mg by mouth 4 times daily (before meals and nightly).  promethazine (PHENERGAN) 25 MG tablet Take 25 mg by mouth every 6 hours as needed.         cyclobenzaprine (FLEXERIL) 10 MG tablet Take 10 mg by mouth 3

## 2017-12-05 NOTE — H&P
fluticasone (FLOVENT HFA) 220 MCG/ACT inhaler Inhale 1 puff into the lungs 2 times daily      Naproxen Sodium (ALEVE) 220 MG CAPS Take by mouth      ibuprofen (ADVIL;MOTRIN) 200 MG tablet Take 200 mg by mouth every 6 hours as needed for Pain      tamsulosin (FLOMAX) 0.4 MG capsule Take 0.4 mg by mouth daily      albuterol sulfate  (90 BASE) MCG/ACT inhaler Inhale 2 puffs into the lungs every 6 hours as needed for Wheezing      Esomeprazole Magnesium (NEXIUM PO) Take by mouth      ranitidine (ZANTAC) 300 MG tablet Take 300 mg by mouth 2 times daily      atorvastatin (LIPITOR) 20 MG tablet TAKE 1 TABLET BY MOUTH DAILY. 0    spironolactone (ALDACTONE) 25 MG tablet TAKE 1 TABLET BY MOUTH EVERY DAY  0    temazepam (RESTORIL) 15 MG capsule Take 15 mg by mouth nightly as needed for Sleep      B-D TB SYRINGE 1CC/25GX5/8\" 25G X 5/8\" 1 ML MISC USE AS DIRECTED 20 each 3    cyanocobalamin 1000 MCG/ML injection INJECT 1ML INTRAMUSCULARLY EVERY 30 DAYS 1 mL 5    diphenhydrAMINE (BENADRYL) 25 MG tablet Take 25 mg by mouth every 6 hours as needed for Itching.  PROPRANOLOL HCL PO   Take 40 mg by mouth daily       clonazePAM (KLONOPIN) 1 MG tablet Take 1 mg by mouth 2 times daily as needed (Uses as a muscle relaxer).  ferrous sulfate 325 (65 FE) MG tablet Take 325 mg by mouth daily (with breakfast).  dicyclomine (BENTYL) 10 MG capsule Take 10 mg by mouth 4 times daily (before meals and nightly).  promethazine (PHENERGAN) 25 MG tablet Take 25 mg by mouth every 6 hours as needed.  cyclobenzaprine (FLEXERIL) 10 MG tablet Take 10 mg by mouth 3 times daily as needed.  diphenoxylate-atropine (LOMOTIL) 2.5-0.025 MG per tablet Take 1 tablet by mouth 4 times daily as needed.  hydrocortisone (ANUSOL-HC) 25 MG suppository Place 25 mg rectally 2 times daily.          Current Facility-Administered Medications on File Prior to Encounter   Medication Dose Route Frequency Provider Last male here for EGD with anesthesia  2. Procedure options, risks and benefits reviewed with patient. Patient expresses understanding.      Francisca Chaudhary MD  [address]  12/5/2017

## 2017-12-05 NOTE — OP NOTE
[address] [place] [address]  Endoscopy Note    Patient: Qiana Thurston  : 1965  Acct#: [de-identified]    Procedure: Esophagogastroduodenoscopy with biopsy, esophageal balloon dilation    Date:  2017     Surgeon:  Roberto Lopez MD    Referring Physician:   Juliano Omer    Preoperative Diagnosis:   Dysphagia    Postoperative Diagnosis:   1) Distal esophageal stenosis      2) Gastric antral ulcers      3) Duodenal erosions    Anesthesia:  TIVA/MAC per anesthesia     EBL: <50 mL    Indications: This is a 47y.o. year old male who presents today with New-onset dyspepsia and dysphagia. He has a history of eosinophilic esophagitis. Description of Procedure:  Informed consent was obtained from the patient after explanation of indications, benefits and possible risks and complications of the procedure. The patient was then taken to the endoscopy suite, placed in the left lateral decubitus position and the above IV sedation was administrered. The Olympus videoendoscope was placed in the patient's mouth and under direct visualization passed into the esophagus. Visualization of the esophagus demonstrated normal mucosa. There was mild stenosis at the GE junction that was balloon dilated to 20 mm. The scope was then advanced into the stomach. Visualization of the gastric body and antrum demonstrated multiple small ulcers (2 - 3 mm) that were cold biopsied. A retroflexed exam of the gastric cardia and fundus demonstrated normal mucosa. The pylorus was patent and the scope was advanced into the duodenum. Visualization of the duodenal bulb demonstrated a single ulceration ~3 mm in size. The second portion of the duodenum demonstrated patchy mucosal erythema. The scope was then withdrawn back into the stomach, it was decompressed, and the scope was completely withdrawn. The patient tolerated the procedure well and was taken to the post anesthesia care unit in good condition.     Impression: 1) Distal esophageal stenosis - balloon dilated. 2) Gastric antral ulcers - cold biopsied     3) Duodenal bulb ulceration       Recommendations: 1) Stop NSAID's as much as possible. 2) Consider increase Nexium to 40 mg BID. 3) Check pathology. 4) Follow up in office in 3 months.     Lisbeth Yo, [address]

## 2017-12-05 NOTE — PROGRESS NOTES
Patient received to PACU in stable condition. VSS. Patient meets discharge criteria for phase 1. Seen by anesthesia. OK to transfer to phase 2.

## 2018-02-01 ENCOUNTER — TELEPHONE (OUTPATIENT)
Dept: INTERNAL MEDICINE CLINIC | Age: 53
End: 2018-02-01

## 2018-02-22 ENCOUNTER — HOSPITAL ENCOUNTER (OUTPATIENT)
Dept: ENDOSCOPY | Age: 53
Discharge: HOME | End: 2018-02-01
Attending: INTERNAL MEDICINE | Admitting: INTERNAL MEDICINE

## 2018-03-01 ENCOUNTER — TELEPHONE (OUTPATIENT)
Dept: RHEUMATOLOGY | Age: 53
End: 2018-03-01

## 2018-03-13 ENCOUNTER — TELEPHONE (OUTPATIENT)
Dept: INTERNAL MEDICINE CLINIC | Age: 53
End: 2018-03-13

## 2018-04-20 ENCOUNTER — TELEPHONE (OUTPATIENT)
Dept: RHEUMATOLOGY | Age: 53
End: 2018-04-20

## 2018-04-25 ENCOUNTER — TELEPHONE (OUTPATIENT)
Dept: RHEUMATOLOGY | Age: 53
End: 2018-04-25

## 2018-05-18 ENCOUNTER — TELEPHONE (OUTPATIENT)
Dept: RHEUMATOLOGY | Age: 53
End: 2018-05-18

## 2018-05-29 ENCOUNTER — TELEPHONE (OUTPATIENT)
Dept: INTERNAL MEDICINE CLINIC | Age: 53
End: 2018-05-29

## 2018-06-13 ENCOUNTER — OFFICE VISIT (OUTPATIENT)
Dept: RHEUMATOLOGY | Age: 53
End: 2018-06-13

## 2018-06-13 VITALS
HEIGHT: 69 IN | BODY MASS INDEX: 33.18 KG/M2 | SYSTOLIC BLOOD PRESSURE: 138 MMHG | DIASTOLIC BLOOD PRESSURE: 86 MMHG | WEIGHT: 224 LBS | HEART RATE: 72 BPM

## 2018-06-13 DIAGNOSIS — L40.50 PSORIATIC ARTHRITIS (HCC): Primary | Chronic | ICD-10-CM

## 2018-06-13 PROCEDURE — 99213 OFFICE O/P EST LOW 20 MIN: CPT | Performed by: INTERNAL MEDICINE

## 2018-07-05 ENCOUNTER — TELEPHONE (OUTPATIENT)
Dept: INTERNAL MEDICINE CLINIC | Age: 53
End: 2018-07-05

## 2018-07-05 RX ORDER — PROMETHAZINE HYDROCHLORIDE 12.5 MG/1
12.5 TABLET ORAL EVERY 6 HOURS PRN
Qty: 20 TABLET | Refills: 0 | Status: SHIPPED | OUTPATIENT
Start: 2018-07-05 | End: 2018-08-04

## 2018-07-06 ENCOUNTER — TELEPHONE (OUTPATIENT)
Dept: INTERNAL MEDICINE CLINIC | Age: 53
End: 2018-07-06

## 2018-07-10 ENCOUNTER — TELEPHONE (OUTPATIENT)
Dept: INTERNAL MEDICINE CLINIC | Age: 53
End: 2018-07-10

## 2018-07-10 NOTE — TELEPHONE ENCOUNTER
Chris Rapp from [facility] calling ---needs to know if PA was ever done and if so who was it submitted to----please call Chris Rapp at [phone]. Thanks.

## 2018-07-10 NOTE — TELEPHONE ENCOUNTER
Called Keira. We did not received form for PA. Pt is enrolled in $25 copay card and getting [employer] but still needs PA sent to [facility]. She will have hub send form again to us.

## 2018-07-12 ENCOUNTER — TELEPHONE (OUTPATIENT)
Dept: INTERNAL MEDICINE CLINIC | Age: 53
End: 2018-07-12

## 2018-07-12 NOTE — TELEPHONE ENCOUNTER
Pt was recently in the ER. They discovered a hiatal hernia. He has an apt with his Cardiologist next week. He is going to have radioactive dye to check things out before they decide what to do with the hernia. He is wanting to make sure that he should still take the [address].

## 2018-08-02 ENCOUNTER — OFFICE VISIT (OUTPATIENT)
Dept: SURGERY | Age: 53
End: 2018-08-02

## 2018-08-02 VITALS
HEIGHT: 70 IN | BODY MASS INDEX: 33.21 KG/M2 | DIASTOLIC BLOOD PRESSURE: 80 MMHG | SYSTOLIC BLOOD PRESSURE: 116 MMHG | WEIGHT: 232 LBS

## 2018-08-02 DIAGNOSIS — K44.9 HIATAL HERNIA WITH GERD: Primary | ICD-10-CM

## 2018-08-02 DIAGNOSIS — K21.9 HIATAL HERNIA WITH GERD: Primary | ICD-10-CM

## 2018-08-02 PROCEDURE — 99243 OFF/OP CNSLTJ NEW/EST LOW 30: CPT | Performed by: SURGERY

## 2018-08-02 ASSESSMENT — ENCOUNTER SYMPTOMS
WHEEZING: 1
VOICE CHANGE: 1
SHORTNESS OF BREATH: 1
PHOTOPHOBIA: 1
ABDOMINAL PAIN: 1
FACIAL SWELLING: 1
ABDOMINAL DISTENTION: 1
TROUBLE SWALLOWING: 1
BACK PAIN: 1
VOMITING: 1
COLOR CHANGE: 1
ANAL BLEEDING: 1
SORE THROAT: 1
NAUSEA: 1

## 2018-08-02 NOTE — LETTER
[facility]  [address]  Phone: [phone]  Fax: [phone]    Abelino Cameron MD        August 2, 2018     Patient: Aden Jacome   YOB: 1965   Date of Visit: 8/2/2018       To Whom It May Concern:       Radha Orona was seen in our office today for a consultation. If you have any questions or concerns, please don't hesitate to call.     Sincerely,        Abelino Cameron MD

## 2018-08-02 NOTE — PROGRESS NOTES
[facility] AND LAPAROSCOP[facility]GERY                       PATIENT NAME: Marcelino Wilkes        TODAY'S DATE: 8/2/2018    Reason for Consult:  Hiatal Hernia    Requesting Physician:  Dr. Gabriel Johnston:              The patient is a 46 y.o. male who presents with dysphagia, pain, mid sternum, epigastric, sharp, focal, sevre episodes. Has gone to ER. Has had CT with small Hiatal hernia present. Had prior EGD with distal esophageal stenosis dilated last year. No emesis, has frequent regurgitation and sleeps with 3 pillows. Pt also has muscular sx's and psoriatic arthritis. Past Medical History:        Diagnosis Date    Anesthesia     wakes up early    Arthritis     psoriatic    Asthma     GERD (gastroesophageal reflux disease)     Headache(784.0)     Hyperlipidemia     Hypertension     Irritable bowel syndrome     Seizures ([address]     27 years ago  accident no seizure in years       Past Surgical History:        Procedure Laterality Date    CHOLECYSTECTOMY      DENTAL SURGERY      ERCP  03/21/2017    with sphincterotomy, stent placement, biopsy , balloon sweep    ERCP  10/24/2017    ALLAN,SPHINCTEROTOMY, PLACEMENT OF PANCREATIC STENT, BIOPSY OF AMPULLA    HERNIA REPAIR      SHOULDER SURGERY      x2    UPPER GASTROINTESTINAL ENDOSCOPY  03/21/2017    UPPER GASTROINTESTINAL ENDOSCOPY  12/05/2017    dilation and biopsy       Current Medications:   Current Facility-Administered Medications: triamcinolone acetonide (KENALOG-40) injection 20 mg, 20 mg, Intra-articular, Once  lidocaine 1 % injection 1 mL, 1 mL, Intra-articular, Once  Prior to Admission medications    Medication Sig Start Date End Date Taking?  Authorizing Provider   promethazine (PHENERGAN) 12.5 MG tablet Take 1 tablet by mouth every 6 hours as needed for Nausea 7/5/18 8/4/18 Yes Sebas Guerrero MD   adalimumab (HUMIRA) 40 MG/0.8ML injection Inject 40 mg into the skin once a week  Patient taking change. HENT: Positive for facial swelling, hearing loss, mouth sores, nosebleeds, sore throat, trouble swallowing and voice change. Eyes: Positive for photophobia and visual disturbance. Respiratory: Positive for shortness of breath and wheezing. Cardiovascular: Positive for chest pain and palpitations. Gastrointestinal: Positive for abdominal distention, abdominal pain, anal bleeding, nausea and vomiting. Endocrine: Positive for heat intolerance. Genitourinary: Positive for difficulty urinating and urgency. Musculoskeletal: Positive for back pain, gait problem, joint swelling, neck pain and neck stiffness. Skin: Positive for color change and pallor. Allergic/Immunologic: Positive for environmental allergies and food allergies. Neurological: Positive for dizziness, tremors, weakness, light-headedness, numbness and headaches. Hematological: Bruises/bleeds easily. Psychiatric/Behavioral: Positive for agitation, confusion, decreased concentration and sleep disturbance. The patient is nervous/anxious.         PHYSICAL EXAM:  VITALS:  /80   Ht 5' 10\" (1.778 m)   Wt 232 lb (105.2 kg)   BMI 33.29 kg/m²   CONSTITUTIONAL:  alert, no apparent distress and mildly overweight  EYES:  sclera clear  ENT:  normocepalic, without obvious abnormality  NECK:  supple, symmetrical, trachea midline and no carotid bruits, no mass  LUNGS:  clear to auscultation  CARDIOVASCULAR:  regular rate and rhythm  ABDOMEN:  no scars, normal bowel sounds, soft, non-distended, tenderness noted on the xyphoid process, voluntary guarding absent, no masses palpated, no hepatosplenomegally and hernia absent  MUSCULOSKELETAL:  0+ pitting edema lower extremities  NEUROLOGIC:  Mental Status Exam:  Level of Alertness:   awake  Orientation:   person, place, time  SKIN:  no bruising or bleeding, normal skin color, texture, turgor, no redness, warmth, or swelling and no jaundice        Radiology Review: CT    IMPRESSION/RECOMMENDATIONS:    Hiatal hernia with GERD  Esophageal stricture    Continue work up with UGI, PH, Manometry, and repeat EGD. See results, see Dr. Lisa Tenorio and see back in office to evl results and plan surgery if it would be helpful. The problem and plan were discussed in detail with the patient today. All questions have been answered, and they agree to proceed.     Queenie Apple

## 2018-08-15 ENCOUNTER — TELEPHONE (OUTPATIENT)
Dept: RHEUMATOLOGY | Age: 53
End: 2018-08-15

## 2018-08-15 NOTE — TELEPHONE ENCOUNTER
Received a Denial for medical necessity. States Patient must try and fail one of the following: Cimzia, Stelara, or Simponi AND trial and failure to [address] and [employer]. Denial attached. Please advise patient.

## 2018-09-17 ENCOUNTER — TELEPHONE (OUTPATIENT)
Dept: INTERNAL MEDICINE CLINIC | Age: 53
End: 2018-09-17

## 2018-09-19 NOTE — TELEPHONE ENCOUNTER
Delories Moritz called back. Appeal can be done after pt's appointment on 10/2/18. Needs to call [facility] at [phone]. They will need copy of pa denial, letter of medication necessity, and not supporting step therapy. WILL HOLD MESSAGE UNTIL EVALUATED AT APPOINTMENT.

## 2018-09-26 ENCOUNTER — HOSPITAL ENCOUNTER (OUTPATIENT)
Dept: ENDOSCOPY | Age: 53
Discharge: HOME | End: 2018-09-26
Payer: COMMERCIAL

## 2018-09-26 PROCEDURE — 3609015500 HC GASTRIC/DUODENAL MOTILITY &/OR MANOMETRY STUDY

## 2018-09-26 NOTE — PROGRESS NOTES
Time in Room: 805  Patient verbalized understanding of PH/Esophageal manometry study. Probe inserted into left nostril with no resistance. Study completed. Patient decided not to do the 24 hr PH portion of study. Discharge instructions given. Patient denied further questions, nausea or pain. Walked to exit by this RN.   Time out of Room: 906

## 2018-09-27 ENCOUNTER — HOSPITAL ENCOUNTER (OUTPATIENT)
Dept: GENERAL RADIOLOGY | Age: 53
Discharge: HOME | End: 2018-09-27
Payer: COMMERCIAL

## 2018-09-27 DIAGNOSIS — K44.9 HIATAL HERNIA WITH GERD: ICD-10-CM

## 2018-09-27 DIAGNOSIS — K21.9 HIATAL HERNIA WITH GERD: ICD-10-CM

## 2018-09-27 PROCEDURE — 74240 X-RAY XM UPR GI TRC 1CNTRST: CPT

## 2018-10-01 RX ORDER — IXEKIZUMAB 80 MG/ML
INJECTION, SOLUTION SUBCUTANEOUS
Qty: 1 ML | Refills: 0 | Status: SHIPPED | OUTPATIENT
Start: 2018-10-01 | End: 2018-12-26

## 2018-10-02 ENCOUNTER — OFFICE VISIT (OUTPATIENT)
Dept: RHEUMATOLOGY | Age: 53
End: 2018-10-02
Payer: COMMERCIAL

## 2018-10-02 VITALS
HEART RATE: 80 BPM | WEIGHT: 230 LBS | DIASTOLIC BLOOD PRESSURE: 78 MMHG | SYSTOLIC BLOOD PRESSURE: 148 MMHG | HEIGHT: 70 IN | BODY MASS INDEX: 32.93 KG/M2

## 2018-10-02 DIAGNOSIS — L40.50 PSORIATIC ARTHRITIS (HCC): Primary | Chronic | ICD-10-CM

## 2018-10-02 DIAGNOSIS — Z79.899 ENCOUNTER FOR LONG-TERM (CURRENT) USE OF HIGH-RISK MEDICATION: ICD-10-CM

## 2018-10-02 DIAGNOSIS — L40.50 PSORIATIC ARTHRITIS (HCC): Chronic | ICD-10-CM

## 2018-10-02 LAB
ALBUMIN SERPL-MCNC: 4.4 G/DL (ref 3.4–5)
ALP BLD-CCNC: 75 U/L (ref 40–129)
ALT SERPL-CCNC: 28 U/L (ref 10–40)
AST SERPL-CCNC: 24 U/L (ref 15–37)
BASOPHILS ABSOLUTE: 0 K/UL (ref 0–0.2)
BASOPHILS RELATIVE PERCENT: 0.3 %
BILIRUB SERPL-MCNC: 0.3 MG/DL (ref 0–1)
BILIRUBIN DIRECT: <0.2 MG/DL (ref 0–0.3)
BILIRUBIN, INDIRECT: NORMAL MG/DL (ref 0–1)
C-REACTIVE PROTEIN: 2 MG/L (ref 0–5.1)
CREAT SERPL-MCNC: 0.9 MG/DL (ref 0.9–1.3)
EOSINOPHILS ABSOLUTE: 0.3 K/UL (ref 0–0.6)
EOSINOPHILS RELATIVE PERCENT: 4.3 %
GFR AFRICAN AMERICAN: >60
GFR NON-AFRICAN AMERICAN: >60
HCT VFR BLD CALC: 45.7 % (ref 40.5–52.5)
HEMOGLOBIN: 15.6 G/DL (ref 13.5–17.5)
IRON SATURATION: 20 % (ref 20–50)
IRON: 77 UG/DL (ref 59–158)
LYMPHOCYTES ABSOLUTE: 1.7 K/UL (ref 1–5.1)
LYMPHOCYTES RELATIVE PERCENT: 22.6 %
MCH RBC QN AUTO: 30.6 PG (ref 26–34)
MCHC RBC AUTO-ENTMCNC: 34.2 G/DL (ref 31–36)
MCV RBC AUTO: 89.3 FL (ref 80–100)
MONOCYTES ABSOLUTE: 0.4 K/UL (ref 0–1.3)
MONOCYTES RELATIVE PERCENT: 4.9 %
NEUTROPHILS ABSOLUTE: 5.1 K/UL (ref 1.7–7.7)
NEUTROPHILS RELATIVE PERCENT: 67.9 %
PDW BLD-RTO: 13.7 % (ref 12.4–15.4)
PLATELET # BLD: 208 K/UL (ref 135–450)
PMV BLD AUTO: 7.2 FL (ref 5–10.5)
RBC # BLD: 5.11 M/UL (ref 4.2–5.9)
TOTAL IRON BINDING CAPACITY: 390 UG/DL (ref 260–445)
TOTAL PROTEIN: 7.6 G/DL (ref 6.4–8.2)
WBC # BLD: 7.5 K/UL (ref 4–11)

## 2018-10-02 PROCEDURE — 99213 OFFICE O/P EST LOW 20 MIN: CPT | Performed by: INTERNAL MEDICINE

## 2018-10-09 ENCOUNTER — TELEPHONE (OUTPATIENT)
Dept: INTERNAL MEDICINE CLINIC | Age: 53
End: 2018-10-09

## 2018-10-10 ENCOUNTER — TELEPHONE (OUTPATIENT)
Dept: PSYCHIATRY | Age: 53
End: 2018-10-10

## 2018-10-26 ENCOUNTER — TELEPHONE (OUTPATIENT)
Dept: INTERNAL MEDICINE CLINIC | Age: 53
End: 2018-10-26

## 2018-10-26 NOTE — TELEPHONE ENCOUNTER
Pt called stating he was supposed to receive a form to sign for the Cimzia. He was unable to make it into the office to sign due to his work schedule so they were going to mail it to him. He states she has never received this form in the mail. He is wanting to see if this has been mailed out. Please advise.

## 2018-11-02 ENCOUNTER — TELEPHONE (OUTPATIENT)
Dept: INTERNAL MEDICINE CLINIC | Age: 53
End: 2018-11-02

## 2018-11-06 ENCOUNTER — TELEPHONE (OUTPATIENT)
Dept: INTERNAL MEDICINE CLINIC | Age: 53
End: 2018-11-06

## 2018-11-27 ENCOUNTER — TELEPHONE (OUTPATIENT)
Dept: INTERNAL MEDICINE CLINIC | Age: 53
End: 2018-11-27

## 2018-11-27 NOTE — TELEPHONE ENCOUNTER
Pt called and said that he received his first shipment of cimzia and is calling to see how he is to use it for the first time

## 2018-12-13 ENCOUNTER — TELEPHONE (OUTPATIENT)
Dept: INTERNAL MEDICINE CLINIC | Age: 53
End: 2018-12-13

## 2018-12-13 NOTE — TELEPHONE ENCOUNTER
Pt called. Recently started Cimzia. He is experiencing nausea and vomiting. He missed work Tues, Wed and today. He is asking for a letter sent to employer for these days missed. His PCP gave him a RX for phenergan to help with the nausea and vomiting. This unfortunately makes him very tired and he is unable to perform his job duties. HE would like to know if Dr. Eden Barnett can send in a RX for generic Zofran to [facility] listed in chart.

## 2018-12-18 ENCOUNTER — TELEPHONE (OUTPATIENT)
Dept: INTERNAL MEDICINE CLINIC | Age: 53
End: 2018-12-18

## 2018-12-18 RX ORDER — PREDNISONE 10 MG/1
10 TABLET ORAL DAILY
Qty: 7 TABLET | Refills: 0 | Status: SHIPPED | OUTPATIENT
Start: 2018-12-18 | End: 2018-12-25

## 2018-12-18 NOTE — TELEPHONE ENCOUNTER
Patient had a reaction to [place] last week. He is now experiencing a rash and itching due to this. He is requesting a prednisone script sent to his pharmacy. Please advise.

## 2018-12-19 ENCOUNTER — TELEPHONE (OUTPATIENT)
Dept: INTERNAL MEDICINE CLINIC | Age: 53
End: 2018-12-19

## 2018-12-20 ENCOUNTER — TELEPHONE (OUTPATIENT)
Dept: INTERNAL MEDICINE CLINIC | Age: 53
End: 2018-12-20

## 2018-12-20 RX ORDER — PREDNISONE 1 MG/1
TABLET ORAL
Qty: 21 TABLET | Refills: 0 | Status: SHIPPED | OUTPATIENT
Start: 2018-12-20 | End: 2018-12-26 | Stop reason: SDUPTHER

## 2018-12-26 ENCOUNTER — OFFICE VISIT (OUTPATIENT)
Dept: RHEUMATOLOGY | Age: 53
End: 2018-12-26
Payer: COMMERCIAL

## 2018-12-26 ENCOUNTER — TELEPHONE (OUTPATIENT)
Dept: INTERNAL MEDICINE CLINIC | Age: 53
End: 2018-12-26

## 2018-12-26 VITALS
HEIGHT: 70 IN | SYSTOLIC BLOOD PRESSURE: 128 MMHG | DIASTOLIC BLOOD PRESSURE: 82 MMHG | BODY MASS INDEX: 32.21 KG/M2 | HEART RATE: 88 BPM | WEIGHT: 225 LBS

## 2018-12-26 DIAGNOSIS — L98.9 SKIN ABNORMALITY: Primary | ICD-10-CM

## 2018-12-26 DIAGNOSIS — L40.50 PSORIATIC ARTHRITIS (HCC): Primary | Chronic | ICD-10-CM

## 2018-12-26 PROCEDURE — 99213 OFFICE O/P EST LOW 20 MIN: CPT | Performed by: INTERNAL MEDICINE

## 2018-12-26 RX ORDER — PREDNISONE 1 MG/1
10 TABLET ORAL DAILY
Qty: 60 TABLET | Refills: 0 | Status: SHIPPED | OUTPATIENT
Start: 2018-12-26 | End: 2019-01-11 | Stop reason: SDUPTHER

## 2018-12-26 NOTE — TELEPHONE ENCOUNTER
Pt was just in the office and is calling asking for a work note excusing him from work on Fri 12/21, Sat 12/22, Sun 12/23, 12/26 & Thurs 12/27 Ro[place]    Fax [phone]

## 2018-12-31 ENCOUNTER — TELEPHONE (OUTPATIENT)
Dept: INTERNAL MEDICINE CLINIC | Age: 53
End: 2018-12-31

## 2019-01-07 ENCOUNTER — TELEPHONE (OUTPATIENT)
Dept: INTERNAL MEDICINE CLINIC | Age: 54
End: 2019-01-07

## 2019-01-08 ENCOUNTER — TELEPHONE (OUTPATIENT)
Dept: INTERNAL MEDICINE CLINIC | Age: 54
End: 2019-01-08

## 2019-01-10 ENCOUNTER — TELEPHONE (OUTPATIENT)
Dept: RHEUMATOLOGY | Age: 54
End: 2019-01-10

## 2019-01-11 ENCOUNTER — TELEPHONE (OUTPATIENT)
Dept: INTERNAL MEDICINE CLINIC | Age: 54
End: 2019-01-11

## 2019-01-11 RX ORDER — PREDNISONE 1 MG/1
10 TABLET ORAL DAILY
Qty: 60 TABLET | Refills: 0 | Status: SHIPPED | OUTPATIENT
Start: 2019-01-11 | End: 2019-01-14 | Stop reason: SDUPTHER

## 2019-01-14 ENCOUNTER — TELEPHONE (OUTPATIENT)
Dept: INTERNAL MEDICINE CLINIC | Age: 54
End: 2019-01-14

## 2019-01-14 RX ORDER — PREDNISONE 1 MG/1
15 TABLET ORAL DAILY
Qty: 90 TABLET | Refills: 0 | OUTPATIENT
Start: 2019-01-14 | End: 2019-02-09 | Stop reason: SDUPTHER

## 2019-02-05 ENCOUNTER — TELEPHONE (OUTPATIENT)
Dept: INTERNAL MEDICINE CLINIC | Age: 54
End: 2019-02-05

## 2019-02-11 RX ORDER — PREDNISONE 1 MG/1
TABLET ORAL
Qty: 90 TABLET | Refills: 0 | Status: SHIPPED | OUTPATIENT
Start: 2019-02-11 | End: 2019-03-11 | Stop reason: SDUPTHER

## 2019-03-11 RX ORDER — PREDNISONE 1 MG/1
TABLET ORAL
Qty: 90 TABLET | Refills: 0 | Status: SHIPPED | OUTPATIENT
Start: 2019-03-11